# Patient Record
Sex: MALE | Race: WHITE | URBAN - METROPOLITAN AREA
[De-identification: names, ages, dates, MRNs, and addresses within clinical notes are randomized per-mention and may not be internally consistent; named-entity substitution may affect disease eponyms.]

---

## 2024-05-09 ENCOUNTER — INITIAL CONSULT (OUTPATIENT)
Age: 59
End: 2024-05-09

## 2024-05-09 VITALS
HEIGHT: 68 IN | OXYGEN SATURATION: 95 % | WEIGHT: 220 LBS | BODY MASS INDEX: 33.34 KG/M2 | SYSTOLIC BLOOD PRESSURE: 118 MMHG | HEART RATE: 62 BPM | RESPIRATION RATE: 16 BRPM | TEMPERATURE: 97.3 F | DIASTOLIC BLOOD PRESSURE: 66 MMHG

## 2024-05-09 DIAGNOSIS — R45.89 ANXIETY ABOUT HEALTH: Primary | ICD-10-CM

## 2024-05-09 DIAGNOSIS — G89.3 CANCER RELATED PAIN: ICD-10-CM

## 2024-05-09 DIAGNOSIS — C79.51 SECONDARY CARCINOMA OF BONE (HCC): ICD-10-CM

## 2024-05-09 DIAGNOSIS — C61 PROSTATE CANCER (HCC): ICD-10-CM

## 2024-05-09 DIAGNOSIS — Z79.818 ANDROGEN DEPRIVATION THERAPY: ICD-10-CM

## 2024-05-09 PROCEDURE — 99204 OFFICE O/P NEW MOD 45 MIN: CPT | Performed by: INTERNAL MEDICINE

## 2024-05-09 RX ORDER — CHLORAL HYDRATE 500 MG
1000 CAPSULE ORAL 2 TIMES DAILY
COMMUNITY

## 2024-05-09 RX ORDER — ATENOLOL 50 MG/1
50 TABLET ORAL DAILY
COMMUNITY

## 2024-05-09 RX ORDER — LISINOPRIL 10 MG/1
10 TABLET ORAL DAILY
COMMUNITY

## 2024-05-09 RX ORDER — VIT C/B6/B5/MAGNESIUM/HERB 173 50-5-6-5MG
1000 CAPSULE ORAL 2 TIMES DAILY
COMMUNITY

## 2024-05-09 RX ORDER — GLIMEPIRIDE 4 MG/1
4 TABLET ORAL
COMMUNITY
Start: 2024-02-11

## 2024-05-09 RX ORDER — SPIRONOLACTONE 25 MG/1
25 TABLET ORAL DAILY
COMMUNITY

## 2024-05-09 RX ORDER — SIMVASTATIN 80 MG
80 TABLET ORAL DAILY
COMMUNITY
Start: 2011-03-08

## 2024-05-09 RX ORDER — ASPIRIN 81 MG/1
81 TABLET ORAL DAILY
COMMUNITY

## 2024-05-09 RX ORDER — EMPAGLIFLOZIN 25 MG/1
25 TABLET, FILM COATED ORAL DAILY
COMMUNITY
Start: 2024-04-22

## 2024-05-09 ASSESSMENT — PATIENT HEALTH QUESTIONNAIRE - PHQ9
1. LITTLE INTEREST OR PLEASURE IN DOING THINGS: SEVERAL DAYS
SUM OF ALL RESPONSES TO PHQ QUESTIONS 1-9: 15
8. MOVING OR SPEAKING SO SLOWLY THAT OTHER PEOPLE COULD HAVE NOTICED. OR THE OPPOSITE, BEING SO FIGETY OR RESTLESS THAT YOU HAVE BEEN MOVING AROUND A LOT MORE THAN USUAL: SEVERAL DAYS
SUM OF ALL RESPONSES TO PHQ QUESTIONS 1-9: 15
SUM OF ALL RESPONSES TO PHQ9 QUESTIONS 1 & 2: 3
SUM OF ALL RESPONSES TO PHQ QUESTIONS 1-9: 15
2. FEELING DOWN, DEPRESSED OR HOPELESS: MORE THAN HALF THE DAYS
SUM OF ALL RESPONSES TO PHQ QUESTIONS 1-9: 15
3. TROUBLE FALLING OR STAYING ASLEEP: MORE THAN HALF THE DAYS
9. THOUGHTS THAT YOU WOULD BE BETTER OFF DEAD, OR OF HURTING YOURSELF: NOT AT ALL
6. FEELING BAD ABOUT YOURSELF - OR THAT YOU ARE A FAILURE OR HAVE LET YOURSELF OR YOUR FAMILY DOWN: MORE THAN HALF THE DAYS
4. FEELING TIRED OR HAVING LITTLE ENERGY: NEARLY EVERY DAY
10. IF YOU CHECKED OFF ANY PROBLEMS, HOW DIFFICULT HAVE THESE PROBLEMS MADE IT FOR YOU TO DO YOUR WORK, TAKE CARE OF THINGS AT HOME, OR GET ALONG WITH OTHER PEOPLE: NOT DIFFICULT AT ALL
7. TROUBLE CONCENTRATING ON THINGS, SUCH AS READING THE NEWSPAPER OR WATCHING TELEVISION: MORE THAN HALF THE DAYS
5. POOR APPETITE OR OVEREATING: MORE THAN HALF THE DAYS

## 2024-05-09 NOTE — PROGRESS NOTES
Cancer [place] at [facility]  [address], [address] [facility] [address]  W: [phone]  F: [phone] Patient ID  Name: Kenroy Reno  YOB: 1965  MRN: [id number]  Referring Provider:   Donna Zendejas MD  [address] [place] [address]  Primary Care Provider:   Costa Reno MD       HEMATOLOGY/MEDICAL ONCOLOGY  NOTE     Reason for Evaluation:     Chief Complaint   Patient presents with    New Patient     Oncology History:   Please review original records for clinical decision making. This summary highlights focused aspects of patient's ongoing care and may have a recurring section in notes with either updates or remain unchanged as a longitudinal care summary.  ______________________________________________________  DIAGNOSIS: METASTATIC/RECURRENT PROSTATE CANCER  <>PATHOLOGY<>  [id number] PROSTATE BIOPSY;  [facility]  2/3/2011  ADENOCARCINOMA (PIA RIGHT MID LATERAL, RIGHT BASE MEDIAL, RIGHT BASE LATERAL:  (PIA 4+5=9)     <>STAGING<> RECURRENT DISEASE, Bone Disease, lymph nodes mediastinal,bilateral hilar. Extensive bone disease in thoracic, Lumbar, appendicular skeleton..  Cancer Staging   No matching staging information was found for the patient.     <>GOALS OF CARE<>PALLIATIVE INTENT  <>CURRENT TREATMENT<>Abiraterone,Prednisone  <>PRIOR TREATMENT> Per outside Records ([facility] note from 4/22/20224 (Dr. Price): Prostatectomy 3/2011 (Dr. Sosa). Rise in PSA 7/2014, ADT 2014.    Abitraterone/Prednisone 1/2023--? Completed 6 cycles of taxotere from 1/2023. Started Xgeva 7/2023.  <>BIOMARKERS<>  -- PSA 0.6 from 4/15/24  0.5 9/7/23,  1.5 on 4/24/23, PSA 3.0 from 1/30/23. PSA down to 1.7 on 3/13/23.  PSMA PET on 7./23 reportedly showed treatment response.    Subjective:     History of Present Illness:   Date of Visit: 05/09/24   Kenroy Reno is a 59 y.o. male who presents for an initial evaluation for transfer of care from [facility] for

## 2024-05-10 ENCOUNTER — TELEPHONE (OUTPATIENT)
Age: 59
End: 2024-05-10

## 2024-05-10 NOTE — TELEPHONE ENCOUNTER
[facility] Palliative Medicine Office  Nursing Note  [phone]  Fax [phone]      Name:  Kenroy Reno  YOB: 1965    Received outpatient Palliative Medicine referral from Dr. Darvin Marion to see patient for symptom management and supportive care. Chart  reviewed. Kenroy Reno is a 59 y.o. male with prostate cancer.  Patient's initial office visit with Dr. Marion was on 5/9/24.      Nurse called patient and his wife answered the phone.  She states that she schedules his appointments.  This nurse explained [facility] outpatient Palliative Medicine services. Appointment scheduled for 6/19/24 at 10:30am with Dr. Rome Osuna.  This nurse offered an appointment sooner, wife states they would like to cluster appts on 6/19/24.     Beatriz Bruno RN, Gerontological Nursing-BC, PN

## 2024-05-10 NOTE — TELEPHONE ENCOUNTER
Lvm to get patient set up for Return in about 6 weeks (around 6/17/2024) for MD VISIT, LUPRON, xgeva,, PORT FLUSH.

## 2024-05-13 RX ORDER — ABIRATERONE ACETATE 250 MG/1
1000 TABLET ORAL DAILY
COMMUNITY
Start: 2024-04-08

## 2024-05-13 RX ORDER — PREDNISONE 5 MG/1
5 TABLET ORAL 2 TIMES DAILY
COMMUNITY
Start: 2024-02-11

## 2024-05-20 ENCOUNTER — TELEPHONE (OUTPATIENT)
Age: 59
End: 2024-05-20

## 2024-05-20 NOTE — TELEPHONE ENCOUNTER
The patient's wife called to request an earlier appt w/ Dr. Osuna. He was scheduled for 6/19. I offered 6/5, but she declined. Asked if there was any other doctor at any other location that could see him sooner. Offered 5/23 at 10:00 w/ Dr. Morin at [facility]. She accepted.

## 2024-05-23 ENCOUNTER — OFFICE VISIT (OUTPATIENT)
Age: 59
End: 2024-05-23

## 2024-05-23 ENCOUNTER — TELEPHONE (OUTPATIENT)
Age: 59
End: 2024-05-23

## 2024-05-23 VITALS
HEART RATE: 63 BPM | BODY MASS INDEX: 32.96 KG/M2 | RESPIRATION RATE: 18 BRPM | WEIGHT: 217.5 LBS | HEIGHT: 68 IN | OXYGEN SATURATION: 98 % | SYSTOLIC BLOOD PRESSURE: 143 MMHG | DIASTOLIC BLOOD PRESSURE: 73 MMHG

## 2024-05-23 DIAGNOSIS — F17.200 TOBACCO DEPENDENCE: ICD-10-CM

## 2024-05-23 DIAGNOSIS — C79.51 PROSTATE CANCER METASTATIC TO BONE (HCC): ICD-10-CM

## 2024-05-23 DIAGNOSIS — C61 PROSTATE CANCER METASTATIC TO BONE (HCC): ICD-10-CM

## 2024-05-23 DIAGNOSIS — R53.0 NEOPLASTIC (MALIGNANT) RELATED FATIGUE: ICD-10-CM

## 2024-05-23 DIAGNOSIS — E11.9 TYPE 2 DIABETES MELLITUS WITHOUT COMPLICATION, WITHOUT LONG-TERM CURRENT USE OF INSULIN (HCC): ICD-10-CM

## 2024-05-23 DIAGNOSIS — R64 MALIGNANT CACHEXIA (HCC): ICD-10-CM

## 2024-05-23 DIAGNOSIS — F43.21 ADJUSTMENT DISORDER WITH DEPRESSED MOOD: Primary | ICD-10-CM

## 2024-05-23 PROCEDURE — 99205 OFFICE O/P NEW HI 60 MIN: CPT | Performed by: INTERNAL MEDICINE

## 2024-05-23 NOTE — PROGRESS NOTES
Palliative Medicine Outpatient Clinic  Nurse Check in Note  [phone]    Patient Name: Kenroy Reno  YOB: 1965      Date of Visit: 05/23/2024  Visit Location:  [facility]    Chief patient or family concern today: NP to establish.  Discuss intermittent back pain     Medications:  Med reconciliation was performed with:  Patient    Requested refills:  None    If prescribed an opioid, does patient have access to naloxone at home?:  NA  If No, pend naloxone nasal spray    Function and Symptoms:  Use of assist devices:  None    Palliative Performance Status (PPS):   Palliative Performance Scale (PPS)  PPS: 70    ESAS:  Modified-[place] Symptom Assessment Scale (ESAS)  Tiredness Score: 8  Drowsiness Score: 7  Depression Score: 9  Pain Score: 5  Anxiety Score: 8  Nausea Score: 3  Appetite Score: 5  Dyspnea Score: 4  Constipation: No  Wellbeing Score: 7    Constipation?  No  Last BM: 05/22/24    Advance Care Planning:  Currently listed healthcare agent:      Is there an ACP Note within the past 12 months?  NO  If No, Alert Clinician and/or Social Work      Erika Jeffers LPN        
Encounters:   05/23/24 98.7 kg (217 lb 8 oz)   05/09/24 99.8 kg (220 lb)     Blood pressure (!) 143/73, pulse 63, resp. rate 18, height 1.727 m (5' 8\"), weight 98.7 kg (217 lb 8 oz), SpO2 98 %.  Last bowel movement: See Nursing Note    Constitutional: Overweight white male, sits up comfortably in chair, appears generally well, NAD  Eyes: sclerae anicteric  ENMT: no nasal discharge, moist mucous membranes  Cardiovascular: regular rhythm, distal pulses intact  Respiratory: breathing not labored, symmetric  Gastrointestinal: soft non-tender, +bowel sounds  Musculoskeletal: no deformity, no tenderness to palpation  Skin: warm, dry  Neurologic: cognition intact, following commands, moving all extremities  Psychiatric: full affect    DATA REVIEWED:   CT Result (most recent):  No results found for this or any previous visit from the past 3650 days.      PET Results (most recent):  No results found for this or any previous visit from the past 3650 days.      CONTROLLED SUBSTANCES SAFETY ASSESSMENT (IF ON CONTROLLED SUBSTANCES):     Reviewed patient record in [place] Prescription Drug Monitoring Program (PDMP).    Reviewed opioid safety handout:  [x] Yes   [] No  24 hour opioid dose >150mg morphine equivalent/day:  [] Yes   [] No  Benzodiazepines:  [] Yes   [] No  Sleep apnea:  [] Yes   [] No  Urine Toxicology Testing within last 6 months:  [] Yes   [] No  History of or new aberrant medication taking behaviors:  [] Yes   [x] No  Has Narcan been prescribed [x] Yes   [] No          Only check if applicable and billing time based rather than [facility]    [x]   The total encounter time on this service date was  60 minutes which was spent performing a face-to-face encounter and personally completing the provider-level activities documented in the note.  This includes time spent prior to the visit and after the visit in direct care of the patient.  This time does not include time spent in any separately reportable services.

## 2024-05-23 NOTE — PATIENT INSTRUCTIONS
Dear Kenroy Reno ,    It was a pleasure seeing you today at [facility].   Return in about 4 weeks (around 6/20/2024) for follow up in person or virtual - pt's choice.    If labs or imaging tests have been ordered for you today, please call the office  at [phone] 48 hours after completion to obtain the results.        This is the plan we talked about:    Increase your sertraline (Zoloft) to 50 mg daily starting tomorrow morning.   We will see you in 4 weeks to check on the dose and discuss a possible increase.     We talked about considering a sleep study.  Reach out any time if you'd like a referral for this.    Look into the [facility] (        The Palliative Medicine Team is here to support you and your family.       Sincerely,      Eula Morin MD

## 2024-05-23 NOTE — TELEPHONE ENCOUNTER
Palliative Medicine  Nursing Note  [phone]  Fax [phone]      Telephone Call  Patient Name: Kenroy Reno  YOB: [birth year]    Incoming call transferred to Nurse.    Spoke with Francisco who asked if there is anything he can take OTC for the hot flashes and increased perspiration. He stated his body remains cool but his head breaks out in a sweat.     Forwarded encounter to provider, Dr. Morin for review and recommendations.    Emily Pak RN  Palliative Medicine

## 2024-05-23 NOTE — TELEPHONE ENCOUNTER
The patient is asking if there is anything he can take OTC for hot flashes/sweats. Warm xfer to nurse to further assist.

## 2024-05-24 PROBLEM — E11.9 TYPE 2 DIABETES MELLITUS WITHOUT COMPLICATION, WITHOUT LONG-TERM CURRENT USE OF INSULIN (HCC): Status: ACTIVE | Noted: 2024-05-24

## 2024-05-24 PROBLEM — F17.200 TOBACCO DEPENDENCE: Status: ACTIVE | Noted: 2024-05-24

## 2024-05-24 PROBLEM — F43.21 ADJUSTMENT DISORDER WITH DEPRESSED MOOD: Status: ACTIVE | Noted: 2024-05-24

## 2024-05-24 PROBLEM — C61 PROSTATE CANCER METASTATIC TO BONE (HCC): Status: ACTIVE | Noted: 2024-05-09

## 2024-05-24 PROBLEM — R53.0 NEOPLASTIC (MALIGNANT) RELATED FATIGUE: Status: ACTIVE | Noted: 2024-05-24

## 2024-05-24 NOTE — TELEPHONE ENCOUNTER
Palliative Medicine  Nursing Note  [phone]  Fax [phone]      Telephone Call  Patient Name: Kenroy Reno  YOB: 1965/2024    Follow-up call placed to Francisco regarding hot flashes.     Spoke with Francisco and he informed me he had already been in his MyChart and printed the information and recommendations Dr. Morin advised in her recent note.     Patient does not want to pursue nicotine replacement patches at this time.     Patient know to call with any questions, concerns or changes in condition Palliative Medicine [phone].    Emily Pak RN  Palliative Medicine

## 2024-05-24 NOTE — TELEPHONE ENCOUNTER
The hot flashes are from his androgen deprivation therapy.    An SSRI is actually one of the first line options for this, so it is good we are increasing his Zoloft.    Let's see how he does on this before adding any additional prescription medications.     Other lifestyle strategies:  Lifestyle: Observational studies suggest that avoiding tobacco, keeping ambient temperatures cool and limiting or avoiding caffeine, alcohol, spicy foods and hot liquids may be helpful.    So he should think about cutting back on smoking.  I can start him on a nicotine replacement patch if he would like to try and cut back.

## 2024-05-31 ENCOUNTER — TRANSCRIBE ORDERS (OUTPATIENT)
Facility: HOSPITAL | Age: 59
End: 2024-05-31

## 2024-05-31 DIAGNOSIS — R13.10 DYSPHAGIA, UNSPECIFIED TYPE: Primary | ICD-10-CM

## 2024-06-05 ENCOUNTER — HOSPITAL ENCOUNTER (OUTPATIENT)
Facility: HOSPITAL | Age: 59
Discharge: HOME | End: 2024-06-08
Payer: COMMERCIAL

## 2024-06-05 DIAGNOSIS — R13.10 DYSPHAGIA, UNSPECIFIED TYPE: ICD-10-CM

## 2024-06-05 PROCEDURE — 74220 X-RAY XM ESOPHAGUS 1CNTRST: CPT

## 2024-06-07 ENCOUNTER — TELEPHONE (OUTPATIENT)
Age: 59
End: 2024-06-07

## 2024-06-07 NOTE — TELEPHONE ENCOUNTER
Pt spouse called and stated she would like to go over the pt's recent test results (Labs/ PET scan).Please advise     CB# [phone]   Detail Level: Detailed

## 2024-06-07 NOTE — TELEPHONE ENCOUNTER
[facility]  [phone]    06/07/24 9:24 AM EDT - Called and spoke with the patient's wife, Mai Segovia).  Patient's ID verified x 2.  Advised unfortunately there is no PHI form scanned into his chart so we need to obtain verbal consent from the patient to discuss his care with her.  Claribel gave the phone to the patient and he provided verbal consent to discuss his care with his wife.  Claribel states the patient had an esophogram done on Monday and would like to discuss the results with Dr. Marion.  Advised Dr. Marion is currently out of the office but this nurse can relay their concerns to our NP, Analia.  Claribel states the esophagram impression advises to consider a bone scan for further evaluation but she wonders if a PET scan should be ordered.  She inquired if the additional imaging could be ordered and done prior to the patient's appointment on 6/19 with Dr. Marion.  She states the patient has had increased pain over the past two weeks.  He is having increased difficulty eating and is losing weight.  Encouraged her to reach out to palliative regarding the increased pain to see what recommendations they may have.  Advised this nurse will update our NP of above and call back with recommendations.  Claribel voiced understanding and gratitude for the call.  No further questions or concerns.                 [facility]  [phone]    06/07/24 1:55 PM EDT - Called and spoke with the patient and Claribel.  Patient's ID verified x 2.  Advised we would like to add him on for an appointment to discuss this in more detail.  The patient voiced understanding and gratitude for the call.  He is now scheduled for Tuesday, 6/11 at 11:30 AM.  No further questions or concerns.

## 2024-06-11 ENCOUNTER — OFFICE VISIT (OUTPATIENT)
Age: 59
End: 2024-06-11
Payer: COMMERCIAL

## 2024-06-11 VITALS
HEIGHT: 68 IN | WEIGHT: 215 LBS | TEMPERATURE: 97.9 F | OXYGEN SATURATION: 96 % | RESPIRATION RATE: 18 BRPM | DIASTOLIC BLOOD PRESSURE: 76 MMHG | BODY MASS INDEX: 32.58 KG/M2 | HEART RATE: 67 BPM | SYSTOLIC BLOOD PRESSURE: 116 MMHG

## 2024-06-11 DIAGNOSIS — G89.3 CANCER RELATED PAIN: ICD-10-CM

## 2024-06-11 DIAGNOSIS — C61 PROSTATE CANCER (HCC): ICD-10-CM

## 2024-06-11 DIAGNOSIS — C79.51 SECONDARY CARCINOMA OF BONE (HCC): ICD-10-CM

## 2024-06-11 DIAGNOSIS — R53.83 OTHER FATIGUE: ICD-10-CM

## 2024-06-11 DIAGNOSIS — C79.51 SECONDARY CARCINOMA OF BONE (HCC): Primary | ICD-10-CM

## 2024-06-11 PROCEDURE — 99214 OFFICE O/P EST MOD 30 MIN: CPT | Performed by: INTERNAL MEDICINE

## 2024-06-11 ASSESSMENT — PATIENT HEALTH QUESTIONNAIRE - PHQ9
SUM OF ALL RESPONSES TO PHQ QUESTIONS 1-9: 0
2. FEELING DOWN, DEPRESSED OR HOPELESS: NOT AT ALL
SUM OF ALL RESPONSES TO PHQ QUESTIONS 1-9: 0
1. LITTLE INTEREST OR PLEASURE IN DOING THINGS: NOT AT ALL
SUM OF ALL RESPONSES TO PHQ9 QUESTIONS 1 & 2: 0

## 2024-06-11 NOTE — PROGRESS NOTES
Chief Complaint   Patient presents with    Follow-up           Vitals:    06/11/24 1149   BP: 116/76   Pulse: 67   Resp: 18   Temp: 97.9 °F (36.6 °C)   SpO2: 96%            1. Have you been to the ER, urgent care clinic since your last visit?  Hospitalized since your last visit?  No  2. Have you seen or consulted any other health care providers outside of the [facility] since your last visit?  Include any pap smears or colon screening. Yes Cardiologist, Dermatologist

## 2024-06-11 NOTE — PROGRESS NOTES
Cancer [place] at [facility]  [address], [address] [facility] [address]  W: [phone]  F: [phone] Patient ID  Name: Kenroy Reno  YOB: 1965  MRN: [id number]  Referring Provider:   No referring provider defined for this encounter.  Primary Care Provider:   Costa Reno MD       HEMATOLOGY/MEDICAL ONCOLOGY  NOTE     Reason for Evaluation:     Chief Complaint   Patient presents with    Follow-up     Oncology History:   Please review original records for clinical decision making. This summary highlights focused aspects of patient's ongoing care and may have a recurring section in notes with either updates or remain unchanged as a longitudinal care summary.  ______________________________________________________  DIAGNOSIS: METASTATIC/RECURRENT PROSTATE CANCER  <>PATHOLOGY<>  [id number] PROSTATE BIOPSY;  [facility]  2/3/2011  ADENOCARCINOMA (PIA RIGHT MID LATERAL, RIGHT BASE MEDIAL, RIGHT BASE LATERAL:  (PIA 4+5=9)     <>STAGING<> RECURRENT DISEASE, Bone Disease, lymph nodes mediastinal,bilateral hilar. Extensive bone disease in thoracic, Lumbar, appendicular skeleton..   Cancer Staging   No matching staging information was found for the patient.     <>GOALS OF CARE<>PALLIATIVE INTENT  <>CURRENT TREATMENT<>Abiraterone,Prednisone  <>PRIOR TREATMENT> Per outside Records ([facility] note from 4/22/20224 (Dr. Price): Prostatectomy 3/2011 (Dr. Sosa). Rise in PSA 7/2014, ADT 2014.    Abitraterone/Prednisone 1/2023--? Completed 6 cycles of taxotere from 1/2023. Started Xgeva 7/2023.  <>BIOMARKERS<>  -- PSA 0.6 from 4/15/24  0.5 9/7/23,  1.5 on 4/24/23, PSA 3.0 from 1/30/23. PSA down to 1.7 on 3/13/23.  PSMA PET on 7./23 reportedly showed treatment response.    Subjective:     History of Present Illness:   Date of Visit: 06/11/24   Kenroy Reno is a 59 y.o. male who presents for a follow-up evaluation for metastatic prostate cancer.   He is scheduled for his

## 2024-06-13 LAB — TESTOST SERPL-MCNC: <3 NG/DL (ref 264–916)

## 2024-06-17 ENCOUNTER — TELEPHONE (OUTPATIENT)
Age: 59
End: 2024-06-17

## 2024-06-17 DIAGNOSIS — C79.51 PROSTATE CANCER METASTATIC TO BONE (HCC): ICD-10-CM

## 2024-06-17 DIAGNOSIS — C61 PROSTATE CANCER METASTATIC TO BONE (HCC): ICD-10-CM

## 2024-06-17 DIAGNOSIS — C79.51 PROSTATE CANCER METASTATIC TO BONE (HCC): Primary | ICD-10-CM

## 2024-06-17 DIAGNOSIS — C61 PROSTATE CANCER METASTATIC TO BONE (HCC): Primary | ICD-10-CM

## 2024-06-17 RX ORDER — ALBUTEROL SULFATE 90 UG/1
4 AEROSOL, METERED RESPIRATORY (INHALATION) PRN
Status: CANCELLED | OUTPATIENT
Start: 2024-06-19

## 2024-06-17 RX ORDER — ACETAMINOPHEN 325 MG/1
650 TABLET ORAL
Status: CANCELLED | OUTPATIENT
Start: 2024-06-19

## 2024-06-17 RX ORDER — DIPHENHYDRAMINE HYDROCHLORIDE 50 MG/ML
50 INJECTION INTRAMUSCULAR; INTRAVENOUS
Status: CANCELLED | OUTPATIENT
Start: 2024-06-19

## 2024-06-17 RX ORDER — ONDANSETRON 2 MG/ML
8 INJECTION INTRAMUSCULAR; INTRAVENOUS
Status: CANCELLED | OUTPATIENT
Start: 2024-06-19

## 2024-06-17 RX ORDER — SODIUM CHLORIDE 9 MG/ML
INJECTION, SOLUTION INTRAVENOUS CONTINUOUS
Status: CANCELLED | OUTPATIENT
Start: 2024-06-19

## 2024-06-17 RX ORDER — EPINEPHRINE 1 MG/ML
0.3 INJECTION, SOLUTION, CONCENTRATE INTRAVENOUS PRN
Status: CANCELLED | OUTPATIENT
Start: 2024-06-19

## 2024-06-17 NOTE — TELEPHONE ENCOUNTER
Pt called in stating he was sent to the lab last week for testosterone and PSA labs and they aren't seeing the PSA results, pt in concerned Dr Marion wont have them for the next appt    # [phone]

## 2024-06-17 NOTE — TELEPHONE ENCOUNTER
[facility] [place] [facility]  [phone]    06/17/24 12:43 PM EDT - Called and spoke with the patient's wife, Mai.  Patient's ID verified x 2.  Advised it appears not all of the labs were drawn as they should have been.  Offered to add the labs on for Wednesday's [facility] appointment.  Mai states the patient is going to [facility] this afternoon and asked this nurse to fax the orders to the [facility] off of [address].  Advised this nurse will fax the orders there.  Mai voiced understanding and gratitude for the call.  No further questions or concerns.

## 2024-06-18 ENCOUNTER — TELEPHONE (OUTPATIENT)
Age: 59
End: 2024-06-18

## 2024-06-18 LAB
ALBUMIN SERPL-MCNC: 4.3 G/DL (ref 3.9–4.9)
ALP SERPL-CCNC: 80 IU/L (ref 44–121)
ALT SERPL-CCNC: 19 IU/L (ref 0–44)
AST SERPL-CCNC: 19 IU/L (ref 0–40)
BASOPHILS # BLD AUTO: 0.1 X10E3/UL (ref 0–0.2)
BILIRUB SERPL-MCNC: 0.4 MG/DL (ref 0–1.2)
BUN SERPL-MCNC: 14 MG/DL (ref 8–27)
CALCIUM SERPL-MCNC: 9.6 MG/DL (ref 8.6–10.2)
CHLORIDE SERPL-SCNC: 100 MMOL/L (ref 96–106)
CO2 SERPL-SCNC: 24 MMOL/L (ref 20–29)
CREAT SERPL-MCNC: 0.94 MG/DL (ref 0.76–1.27)
EGFRCR SERPLBLD CKD-EPI 2021: 92 ML/MIN/1.73
EOSINOPHIL NFR BLD AUTO: 1 %
ERYTHROCYTE [DISTWIDTH] IN BLOOD BY AUTOMATED COUNT: 12.8 % (ref 11.6–15.4)
GLOBULIN SER CALC-MCNC: 2.2 G/DL (ref 1.5–4.5)
GLUCOSE SERPL-MCNC: 126 MG/DL (ref 70–99)
HCT VFR BLD AUTO: 43.7 % (ref 37.5–51)
HGB BLD-MCNC: 15.3 G/DL (ref 13–17.7)
IMM GRANULOCYTES # BLD AUTO: 0.1 X10E3/UL (ref 0–0.1)
IMM GRANULOCYTES NFR BLD AUTO: 1 %
LYMPHOCYTES # BLD AUTO: 3.3 X10E3/UL (ref 0.7–3.1)
LYMPHOCYTES NFR BLD AUTO: 27 %
MCH RBC QN AUTO: 32.9 PG (ref 26.6–33)
MCHC RBC AUTO-ENTMCNC: 35 G/DL (ref 31.5–35.7)
MCV RBC AUTO: 94 FL (ref 79–97)
MONOCYTES # BLD AUTO: 1 X10E3/UL (ref 0.1–0.9)
MONOCYTES NFR BLD AUTO: 8 %
NEUTROPHILS # BLD AUTO: 8 X10E3/UL (ref 1.4–7)
NEUTROPHILS NFR BLD AUTO: 62 %
PLATELET # BLD AUTO: 260 X10E3/UL (ref 150–450)
POTASSIUM SERPL-SCNC: 4.2 MMOL/L (ref 3.5–5.2)
PROT SERPL-MCNC: 6.5 G/DL (ref 6–8.5)
PSA SERPL-MCNC: 0.9 NG/ML (ref 0–4)
RBC # BLD AUTO: 4.65 X10E6/UL (ref 4.14–5.8)
SODIUM SERPL-SCNC: 137 MMOL/L (ref 134–144)
WBC # BLD AUTO: 12.6 X10E3/UL (ref 3.4–10.8)

## 2024-06-18 NOTE — TELEPHONE ENCOUNTER
Palliative Medicine  Nursing Note  [phone]  Fax [phone]      Telephone Call  Patient Name: Kenroy Reno  YOB: 1965/2024    Return call placed to patient's wife Claribel regarding patient's current condition.    Spoke with Claribel who informed me that Francisco has become increasingly drowsy over the past two weeks. This past weekend was the worst, she states \"Francisco was a zombi half the time and coherent the other half.\" On Sunday 6/16/24, Francisco kept repeating he can't to Claribel and told her he thought something was wrong with him.    Claribel reports Francisco recently had boss of 48yrs pass away. He also found out his PSA has increased and is scheduled to his Oncologist tomorrow and have a PET scan next week.    Changes in recent medication include discontinuation of Jardiance due to recurrent yeast infections. Patient takes blood sugar three times daily.    Francisco and wife, Claribel would like to know if decreasing his Zoloft 50mg would help with his fatigue?     I have forwarded this encounter to provider, Dr. Morin for review and recommendations.     Emily Pak RN  Palliative Medicine

## 2024-06-18 NOTE — TELEPHONE ENCOUNTER
Follow up phone call the patient's wife, Claribel regarding change in Zoloft.     Per provider, Dr. Morin patient can go down on Zoloft from 50mg to 25mg daily.    Claribel verbalized understanding and said they can cut the current pill in half and during their next virtual visit they will ask for a new prescription for a 25mg tablet.

## 2024-06-18 NOTE — TELEPHONE ENCOUNTER
We can certainly go down on the Zoloft back to 25 mg once a day and see what happens- I support this.  Let me know if he needs a new prescription.

## 2024-06-19 ENCOUNTER — HOSPITAL ENCOUNTER (OUTPATIENT)
Facility: HOSPITAL | Age: 59
Setting detail: INFUSION SERIES
Discharge: HOME | End: 2024-06-19
Payer: COMMERCIAL

## 2024-06-19 ENCOUNTER — PATIENT MESSAGE (OUTPATIENT)
Age: 59
End: 2024-06-19

## 2024-06-19 ENCOUNTER — OFFICE VISIT (OUTPATIENT)
Age: 59
End: 2024-06-19
Payer: COMMERCIAL

## 2024-06-19 VITALS
WEIGHT: 214 LBS | BODY MASS INDEX: 32.43 KG/M2 | SYSTOLIC BLOOD PRESSURE: 127 MMHG | TEMPERATURE: 97.7 F | HEART RATE: 74 BPM | DIASTOLIC BLOOD PRESSURE: 70 MMHG | OXYGEN SATURATION: 98 % | HEIGHT: 68 IN | RESPIRATION RATE: 18 BRPM

## 2024-06-19 VITALS
DIASTOLIC BLOOD PRESSURE: 65 MMHG | RESPIRATION RATE: 18 BRPM | HEIGHT: 68 IN | WEIGHT: 213.2 LBS | BODY MASS INDEX: 32.31 KG/M2 | OXYGEN SATURATION: 97 % | TEMPERATURE: 97.5 F | HEART RATE: 69 BPM | SYSTOLIC BLOOD PRESSURE: 123 MMHG

## 2024-06-19 DIAGNOSIS — T50.905A HOT FLASH DUE TO MEDICATION: ICD-10-CM

## 2024-06-19 DIAGNOSIS — Z79.899 HIGH RISK MEDICATION USE: ICD-10-CM

## 2024-06-19 DIAGNOSIS — C61 PROSTATE CANCER METASTATIC TO BONE (HCC): Primary | ICD-10-CM

## 2024-06-19 DIAGNOSIS — R23.2 HOT FLASH DUE TO MEDICATION: ICD-10-CM

## 2024-06-19 DIAGNOSIS — C61 PROSTATE CANCER (HCC): Primary | ICD-10-CM

## 2024-06-19 DIAGNOSIS — Z79.818 ANDROGEN DEPRIVATION THERAPY: ICD-10-CM

## 2024-06-19 DIAGNOSIS — C79.51 SECONDARY CARCINOMA OF BONE (HCC): Primary | ICD-10-CM

## 2024-06-19 DIAGNOSIS — C79.51 PROSTATE CANCER METASTATIC TO BONE (HCC): Primary | ICD-10-CM

## 2024-06-19 PROCEDURE — 99214 OFFICE O/P EST MOD 30 MIN: CPT | Performed by: INTERNAL MEDICINE

## 2024-06-19 PROCEDURE — 96402 CHEMO HORMON ANTINEOPL SQ/IM: CPT

## 2024-06-19 PROCEDURE — 96372 THER/PROPH/DIAG INJ SC/IM: CPT

## 2024-06-19 PROCEDURE — 6360000002 HC RX W HCPCS: Performed by: INTERNAL MEDICINE

## 2024-06-19 RX ORDER — FAMOTIDINE 10 MG/ML
20 INJECTION, SOLUTION INTRAVENOUS
OUTPATIENT
Start: 2024-09-11

## 2024-06-19 RX ORDER — ACETAMINOPHEN 325 MG/1
650 TABLET ORAL
OUTPATIENT
Start: 2024-07-14

## 2024-06-19 RX ORDER — ALBUTEROL SULFATE 90 UG/1
4 AEROSOL, METERED RESPIRATORY (INHALATION) PRN
OUTPATIENT
Start: 2024-09-11

## 2024-06-19 RX ORDER — PREDNISONE 5 MG/1
5 TABLET ORAL EVERY 12 HOURS
Qty: 180 TABLET | Refills: 0 | Status: SHIPPED | OUTPATIENT
Start: 2024-06-19 | End: 2024-09-17

## 2024-06-19 RX ORDER — DIPHENHYDRAMINE HYDROCHLORIDE 50 MG/ML
50 INJECTION INTRAMUSCULAR; INTRAVENOUS
OUTPATIENT
Start: 2024-07-14

## 2024-06-19 RX ORDER — SODIUM CHLORIDE 9 MG/ML
INJECTION, SOLUTION INTRAVENOUS CONTINUOUS
OUTPATIENT
Start: 2024-07-14

## 2024-06-19 RX ORDER — EPINEPHRINE 1 MG/ML
0.3 INJECTION, SOLUTION INTRAMUSCULAR; SUBCUTANEOUS PRN
OUTPATIENT
Start: 2024-09-11

## 2024-06-19 RX ORDER — ONDANSETRON 2 MG/ML
8 INJECTION INTRAMUSCULAR; INTRAVENOUS
OUTPATIENT
Start: 2024-09-11

## 2024-06-19 RX ORDER — EPINEPHRINE 1 MG/ML
0.3 INJECTION, SOLUTION INTRAMUSCULAR; SUBCUTANEOUS PRN
OUTPATIENT
Start: 2024-07-14

## 2024-06-19 RX ORDER — ALBUTEROL SULFATE 90 UG/1
4 AEROSOL, METERED RESPIRATORY (INHALATION) PRN
OUTPATIENT
Start: 2024-07-14

## 2024-06-19 RX ORDER — DIPHENHYDRAMINE HYDROCHLORIDE 50 MG/ML
50 INJECTION INTRAMUSCULAR; INTRAVENOUS
OUTPATIENT
Start: 2024-09-11

## 2024-06-19 RX ORDER — FAMOTIDINE 10 MG/ML
20 INJECTION, SOLUTION INTRAVENOUS
OUTPATIENT
Start: 2024-07-14

## 2024-06-19 RX ORDER — ACETAMINOPHEN 325 MG/1
650 TABLET ORAL
OUTPATIENT
Start: 2024-09-11

## 2024-06-19 RX ORDER — ONDANSETRON 2 MG/ML
8 INJECTION INTRAMUSCULAR; INTRAVENOUS
OUTPATIENT
Start: 2024-07-14

## 2024-06-19 RX ORDER — SODIUM CHLORIDE 9 MG/ML
INJECTION, SOLUTION INTRAVENOUS CONTINUOUS
OUTPATIENT
Start: 2024-09-11

## 2024-06-19 RX ADMIN — DENOSUMAB 120 MG: 120 INJECTION SUBCUTANEOUS at 14:25

## 2024-06-19 RX ADMIN — LEUPROLIDE ACETATE 22.5 MG: KIT at 14:25

## 2024-06-19 ASSESSMENT — PATIENT HEALTH QUESTIONNAIRE - PHQ9
2. FEELING DOWN, DEPRESSED OR HOPELESS: NOT AT ALL
SUM OF ALL RESPONSES TO PHQ QUESTIONS 1-9: 0
SUM OF ALL RESPONSES TO PHQ QUESTIONS 1-9: 0
SUM OF ALL RESPONSES TO PHQ9 QUESTIONS 1 & 2: 0
1. LITTLE INTEREST OR PLEASURE IN DOING THINGS: NOT AT ALL
SUM OF ALL RESPONSES TO PHQ QUESTIONS 1-9: 0
SUM OF ALL RESPONSES TO PHQ QUESTIONS 1-9: 0

## 2024-06-19 NOTE — PROGRESS NOTES
Chief Complaint   Patient presents with    Follow-up           Vitals:    06/19/24 1454   BP: 127/70   Pulse: 74   Resp: 18   Temp: 97.7 °F (36.5 °C)   SpO2: 98%            1. Have you been to the ER, urgent care clinic since your last visit?  Hospitalized since your last visit?  No  2. Have you seen or consulted any other health care providers outside of the [facility] since your last visit?  Include any pap smears or colon screening. No

## 2024-06-19 NOTE — PROGRESS NOTES
[facility] Progress Note    Date: 2024    Name: Kenroy Reno    MRN: [id number]         : 1965    Mr. Reno Arrived ambulatory and in no distress for PF/Lupron/Xgeva. Port accessed without difficulty, + blood return.         Mr. Reno's vitals were reviewed.  Vitals:    24 1400   BP: 123/65   Pulse: 69   Resp: 18   Temp: 97.5 °F (36.4 °C)   SpO2: 97%       Lab results were  reviewed and within parameters for treatment.     Medications:    Medications Administered         denosumab (XGEVA) SC injection 120 mg Admin Date  2024 Action  Given Dose  120 mg Route  SubCUTAneous Administered By  Ashley Antunez RN        leuprolide (LUPRON) injection 22.5 mg Admin Date  2024 Action  Given Dose  22.5 mg Route  IntraMUSCular Administered By  Ashley Antunez RN               Mr. Reno tolerated treatment well and [facility].   Port flushed and de accessed per protocol. He is aware of next appointment.    Ashley Antunez RN  2024  Future Appointments   Date Time Provider Department Center   2024  2:00 PM ANNY PET DOSE 1 [facility]   2024  3:00 PM ANNY PET 1 [facility]   2024  3:00 PM Eula Morin MD [facility]   2024  2:00 PM SS FASTTRACK 1 [facility]   2024  2:15 PM [facility]   2024  2:00 PM SS FASTTRACK 1 [facility]   2024  2:00 PM SS FASTTRACK 1 [facility]

## 2024-06-20 ENCOUNTER — TELEPHONE (OUTPATIENT)
Age: 59
End: 2024-06-20

## 2024-06-20 RX ORDER — ABIRATERONE ACETATE 250 MG/1
1000 TABLET ORAL DAILY
Qty: 120 TABLET | Refills: 2 | Status: ACTIVE | OUTPATIENT
Start: 2024-06-20

## 2024-06-20 NOTE — TELEPHONE ENCOUNTER
Patients wife would like to know if this patient will be seeing Dr. Marion on the same days as his infusion appointments for 8/22/24 and 9/19/24. Please advise.    # [phone]

## 2024-06-21 NOTE — TELEPHONE ENCOUNTER
Lm with patients wife explaining the nurses verohart message from yesterday afternoon that Dr. Marion will decide at the patients next appointment when his next office visit needs to be. Gave her office number to call back with any questions.

## 2024-06-21 NOTE — TELEPHONE ENCOUNTER
Patients wife called back and I spoke with her about the patients infusions for August and September. I let her know that Dr. Marion will decide at the patients next appointment on 7/24 if he would like to see him same day as his infusions for August and September, I let the patients wife know we can then adjust the appointment times as needed. She verbalized understanding and had no further questions.

## 2024-06-24 PROBLEM — T50.905A HOT FLASH DUE TO MEDICATION: Status: ACTIVE | Noted: 2024-06-24

## 2024-06-24 PROBLEM — Z79.899 HIGH RISK MEDICATION USE: Status: ACTIVE | Noted: 2024-06-24

## 2024-06-24 PROBLEM — R23.2 HOT FLASH DUE TO MEDICATION: Status: ACTIVE | Noted: 2024-06-24

## 2024-06-24 NOTE — PROGRESS NOTES
Cancer [place] at [facility]  [address], [address] [facility] [address]  W: [phone]  F: [phone] Patient ID  Name: Kenroy Reno  YOB: 1965  MRN: [id number]  Referring Provider:   No referring provider defined for this encounter.  Primary Care Provider:   Jose Shanks MD       HEMATOLOGY/MEDICAL ONCOLOGY  NOTE     Reason for Evaluation:     Chief Complaint   Patient presents with    Follow-up     Oncology History:   Please review original records for clinical decision making. This summary highlights focused aspects of patient's ongoing care and may have a recurring section in notes with either updates or remain unchanged as a longitudinal care summary.  ______________________________________________________  DIAGNOSIS: METASTATIC/RECURRENT PROSTATE CANCER  <>PATHOLOGY<>  [id number] PROSTATE BIOPSY;  [facility]  2/3/2011  ADENOCARCINOMA (PIA RIGHT MID LATERAL, RIGHT BASE MEDIAL, RIGHT BASE LATERAL:  (PIA 4+5=9)     <>STAGING<> RECURRENT DISEASE, Bone Disease, lymph nodes mediastinal,bilateral hilar. Extensive bone disease in thoracic, Lumbar, appendicular skeleton..   Cancer Staging   No matching staging information was found for the patient.     <>GOALS OF CARE<>PALLIATIVE INTENT  <>CURRENT TREATMENT<>Abiraterone 1000mg ,Prednisone 5mg every 12h  <>PRIOR TREATMENT> Per outside Records ([facility] note from 4/22/20224 (Dr. Price): Prostatectomy 3/2011 (Dr. Sosa). Rise in PSA 7/2014, ADT 2014.    Abitraterone/Prednisone 1/2023--? Completed 6 cycles of taxotere from 1/2023. Started Xgeva 7/2023.  <>BIOMARKERS<>  -- PSA 0.6 from 4/15/24  0.5 9/7/23,  1.5 on 4/24/23, PSA 3.0 from 1/30/23. PSA down to 1.7 on 3/13/23.  PSMA PET on 7/23 reportedly showed treatment response.    Subjective:     History of Present Illness:   Date of Visit: 06/19/24   Kenroy Reno is a 59 y.o. male who presents for follow-up management for PALLIATIVE INTENT systemic therapy

## 2024-06-26 ENCOUNTER — HOSPITAL ENCOUNTER (OUTPATIENT)
Facility: HOSPITAL | Age: 59
Discharge: HOME | End: 2024-06-29
Attending: INTERNAL MEDICINE
Payer: COMMERCIAL

## 2024-06-26 DIAGNOSIS — C79.51 SECONDARY CARCINOMA OF BONE (HCC): ICD-10-CM

## 2024-06-26 PROCEDURE — 3430000000 HC RX DIAGNOSTIC RADIOPHARMACEUTICAL: Performed by: INTERNAL MEDICINE

## 2024-06-26 PROCEDURE — 78815 PET IMAGE W/CT SKULL-THIGH: CPT

## 2024-06-26 PROCEDURE — A9595 HC RX DIAGNOSTIC RADIOPHARMACEUTICAL: HCPCS | Performed by: INTERNAL MEDICINE

## 2024-06-26 RX ADMIN — PIFLUFOLASTAT F-18 9 MILLICURIE: 80 INJECTION INTRAVENOUS at 13:34

## 2024-06-27 ENCOUNTER — TELEMEDICINE (OUTPATIENT)
Age: 59
End: 2024-06-27
Payer: COMMERCIAL

## 2024-06-27 DIAGNOSIS — R64 MALIGNANT CACHEXIA (HCC): ICD-10-CM

## 2024-06-27 DIAGNOSIS — F17.200 TOBACCO DEPENDENCE: ICD-10-CM

## 2024-06-27 DIAGNOSIS — R53.0 NEOPLASTIC (MALIGNANT) RELATED FATIGUE: ICD-10-CM

## 2024-06-27 DIAGNOSIS — F43.21 ADJUSTMENT DISORDER WITH DEPRESSED MOOD: ICD-10-CM

## 2024-06-27 DIAGNOSIS — G47.9 SLEEP DISTURBANCE: Primary | ICD-10-CM

## 2024-06-27 PROCEDURE — 99214 OFFICE O/P EST MOD 30 MIN: CPT | Performed by: INTERNAL MEDICINE

## 2024-06-27 RX ORDER — SERTRALINE HYDROCHLORIDE 25 MG/1
25 TABLET, FILM COATED ORAL DAILY
Qty: 90 TABLET | Refills: 1 | Status: SHIPPED | COMMUNITY
Start: 2024-06-27

## 2024-06-27 NOTE — PROGRESS NOTES
Palliative Medicine Outpatient Clinic  Nurse Check in Note  [phone]    Patient Name: Kenroy Reno  YOB: 1965      Date of Visit: 06/27/2024  Visit Location:  [facility] Virtual Visit     Chief patient or family concern today: follow up.  Discuss PET scan results which have not been reviewed    Patient's Last Palliative Medicine Clinic Visit Date:  5/23/2024    Have you been to an ER or urgent care center since your last visit?  No  Have you been hospitalized since your last visit? No  Have you seen or consulted any health care providers outside of the [facility] system since your last visit?  No  If Yes, alert PSR to request appropriate records from non-[facility] offices    Medications:  Med reconciliation was performed with:  Patient    Requested refills:  None    If prescribed an opioid, does patient have access to naloxone at home?:  NA - Pt is not taking or prescribed opioids at this time  If No, pend naloxone nasal spray    Function and Symptoms:  Use of assist devices:  None    Palliative Performance Status (PPS):   Palliative Performance Scale (PPS)  PPS: 70    ESAS:  Modified-[place] Symptom Assessment Scale (ESAS)  Tiredness Score: 5  Drowsiness Score: 3  Depression Score: 3  Pain Score: 4  Anxiety Score: 5  Nausea Score: Not nauseated  Appetite Score: 2  Dyspnea Score: No shortness of breath  Constipation: No  Wellbeing Score: 4    Constipation?  No  Last BM: 06/26/24    Advance Care Planning:  Currently listed healthcare agent: Claribel Reno -[phone]      Is there an ACP Note within the past 12 months?  YES  If No, Alert Clinician and/or Social Work      Erika Jeffers LPN

## 2024-06-27 NOTE — PROGRESS NOTES
Palliative Medicine Outpatient Services  [place]: [phone]    Patient Name: Kenroy Reno  YOB: 1965    Date of Current Visit: 06/27/2024  Location of Current Visit:    [x] [facility] Office  [] [facility] Office  [] [facility] Office  [] Home  [] Synchronous real-time A/V virtual visit    Date of Initial Palliative Medicine Visit: 5/23/24   Referral from: Dr. Marion    REASON FOR VISIT:   [x] Establish care   [] Follow up   [] Urgent     FOLLOW UP:   Return in about 1 month (around 7/27/2024) for following sleep study.     ASSESSMENT AND PLAN:     Sleep disturbance  - nocturnal waking, feels un refreshed in am; h/o snoring.  Multiple risk factors for sleep apnea.   - today patient in agreement to pursue a sleep study, prefers locations closer to his home and will only agree to a home sleep test.     - referral to [facility] location for consultation with their sleep physician.   - in meantime- trial of APAP 1000 mg HS to address any discomfort coming from bone pain which may be contributing to waking.   - hold off on trial of new approach for hot flash as pt does not think this is primary .   - now taking prednisone at bedtime, 5 mg, but sleep was problematic before this addition    Adjustment disorder w/ depressed mood- improved  - Sx include fatigue, anhedonia, loss of appetite and wt loss.    - Initiated on Zoloft 25 mg daily by his endocrinologist Dr. Zendejas in early May  - He did not tolerate an increase to 50 mg (foggy / fatigue)  - Recommend to r/o other possible contributing factors to fatigue and anhedonia as noted below  - Offered palliative medicine social work support w/ Fide Gilmore at initial visit, MSW; he declined for now.  Do suspect underlying stress and worry as his spouse is also undergoing cancer care.     - Recommended \"[facility] programs\"- support retreats for men with cancer at initial visit.   - continue Zoloft 25 mg HS    Anorexia and Cachexia  - 7.6% wt loss

## 2024-06-28 ENCOUNTER — TELEPHONE (OUTPATIENT)
Age: 59
End: 2024-06-28

## 2024-06-28 NOTE — TELEPHONE ENCOUNTER
Per Dr. Morin pt is to see PAR in [facility].  Info sent to pt via My Chart for pt to call and make an appt.

## 2024-07-02 ENCOUNTER — TELEPHONE (OUTPATIENT)
Age: 59
End: 2024-07-02

## 2024-07-02 NOTE — TELEPHONE ENCOUNTER
[facility] [place] at [facility]  [phone]    07/02/24 4:40 PM EDT - Called and spoke with the patient.  Patient's ID verified x 2.  Advised, per Dr. Marion, he has not been able to connect with the radiologist yet but is still trying to connect with her.  Advised, per Dr. Marion, they were able to compare the PET scan with his old scans except for his bones.  Advised Dr. Marion recommends we keep the plan as is and keep monitoring his PSA.  The patient voiced understanding and gratitude for the call.  No further questions or concerns.

## 2024-07-02 NOTE — TELEPHONE ENCOUNTER
Pt called in looking to get an update on the clarification for his pet scan results.    CB# [phone]

## 2024-07-17 ENCOUNTER — APPOINTMENT (OUTPATIENT)
Facility: HOSPITAL | Age: 59
End: 2024-07-17
Payer: COMMERCIAL

## 2024-07-23 DIAGNOSIS — C79.51 SECONDARY CARCINOMA OF BONE (HCC): ICD-10-CM

## 2024-07-24 ENCOUNTER — HOSPITAL ENCOUNTER (OUTPATIENT)
Facility: HOSPITAL | Age: 59
Setting detail: INFUSION SERIES
Discharge: HOME | End: 2024-07-24
Payer: COMMERCIAL

## 2024-07-24 ENCOUNTER — OFFICE VISIT (OUTPATIENT)
Age: 59
End: 2024-07-24
Payer: COMMERCIAL

## 2024-07-24 VITALS
TEMPERATURE: 97 F | BODY MASS INDEX: 33.22 KG/M2 | RESPIRATION RATE: 18 BRPM | HEIGHT: 68 IN | WEIGHT: 219.2 LBS | OXYGEN SATURATION: 97 % | DIASTOLIC BLOOD PRESSURE: 62 MMHG | SYSTOLIC BLOOD PRESSURE: 132 MMHG | HEART RATE: 75 BPM

## 2024-07-24 VITALS
DIASTOLIC BLOOD PRESSURE: 68 MMHG | HEIGHT: 68 IN | BODY MASS INDEX: 33.19 KG/M2 | HEART RATE: 82 BPM | SYSTOLIC BLOOD PRESSURE: 130 MMHG | TEMPERATURE: 97.3 F | OXYGEN SATURATION: 98 % | WEIGHT: 219 LBS | RESPIRATION RATE: 16 BRPM

## 2024-07-24 DIAGNOSIS — R45.89 ANXIETY ABOUT HEALTH: ICD-10-CM

## 2024-07-24 DIAGNOSIS — C79.51 PROSTATE CANCER METASTATIC TO BONE (HCC): Primary | ICD-10-CM

## 2024-07-24 DIAGNOSIS — Z79.818 ANDROGEN DEPRIVATION THERAPY: ICD-10-CM

## 2024-07-24 DIAGNOSIS — C61 PROSTATE CANCER METASTATIC TO BONE (HCC): Primary | ICD-10-CM

## 2024-07-24 DIAGNOSIS — Z79.899 HIGH RISK MEDICATION USE: ICD-10-CM

## 2024-07-24 DIAGNOSIS — C79.51 SECONDARY CARCINOMA OF BONE (HCC): Primary | ICD-10-CM

## 2024-07-24 DIAGNOSIS — R23.2 HOT FLASH DUE TO MEDICATION: ICD-10-CM

## 2024-07-24 DIAGNOSIS — T50.905A HOT FLASH DUE TO MEDICATION: ICD-10-CM

## 2024-07-24 LAB
ANION GAP BLD CALC-SCNC: 10.7 MMOL/L (ref 10–20)
CA-I BLD-MCNC: 1.23 MMOL/L (ref 1.12–1.32)
CHLORIDE BLD-SCNC: 102 MMOL/L (ref 98–107)
CO2 BLD-SCNC: 26.3 MMOL/L (ref 21–32)
CREAT BLD-MCNC: 0.58 MG/DL (ref 0.6–1.3)
GLUCOSE BLD-MCNC: 216 MG/DL (ref 70–110)
MAGNESIUM SERPL-MCNC: 1.8 MG/DL (ref 1.6–2.4)
PHOSPHATE SERPL-MCNC: 2.4 MG/DL (ref 2.6–4.7)
POTASSIUM BLD-SCNC: 4.1 MMOL/L (ref 3.5–5.1)
PSA SERPL-MCNC: 0.8 NG/ML (ref 0.01–4)
SERVICE CMNT-IMP: ABNORMAL
SODIUM BLD-SCNC: 139 MMOL/L (ref 136–145)

## 2024-07-24 PROCEDURE — 36415 COLL VENOUS BLD VENIPUNCTURE: CPT

## 2024-07-24 PROCEDURE — 84153 ASSAY OF PSA TOTAL: CPT

## 2024-07-24 PROCEDURE — 84100 ASSAY OF PHOSPHORUS: CPT

## 2024-07-24 PROCEDURE — 99214 OFFICE O/P EST MOD 30 MIN: CPT | Performed by: INTERNAL MEDICINE

## 2024-07-24 PROCEDURE — 83735 ASSAY OF MAGNESIUM: CPT

## 2024-07-24 PROCEDURE — 6360000002 HC RX W HCPCS: Performed by: INTERNAL MEDICINE

## 2024-07-24 PROCEDURE — 96372 THER/PROPH/DIAG INJ SC/IM: CPT

## 2024-07-24 PROCEDURE — 80047 BASIC METABLC PNL IONIZED CA: CPT

## 2024-07-24 RX ORDER — ONDANSETRON 2 MG/ML
8 INJECTION INTRAMUSCULAR; INTRAVENOUS
OUTPATIENT
Start: 2024-08-14

## 2024-07-24 RX ORDER — ALBUTEROL SULFATE 90 UG/1
4 AEROSOL, METERED RESPIRATORY (INHALATION) PRN
OUTPATIENT
Start: 2024-08-14

## 2024-07-24 RX ORDER — SODIUM CHLORIDE 9 MG/ML
INJECTION, SOLUTION INTRAVENOUS CONTINUOUS
OUTPATIENT
Start: 2024-08-14

## 2024-07-24 RX ORDER — EPINEPHRINE 1 MG/ML
0.3 INJECTION, SOLUTION INTRAMUSCULAR; SUBCUTANEOUS PRN
OUTPATIENT
Start: 2024-08-14

## 2024-07-24 RX ORDER — FAMOTIDINE 10 MG/ML
20 INJECTION, SOLUTION INTRAVENOUS
OUTPATIENT
Start: 2024-08-14

## 2024-07-24 RX ORDER — DIPHENHYDRAMINE HYDROCHLORIDE 50 MG/ML
50 INJECTION INTRAMUSCULAR; INTRAVENOUS
OUTPATIENT
Start: 2024-08-14

## 2024-07-24 RX ORDER — ACETAMINOPHEN 325 MG/1
650 TABLET ORAL
OUTPATIENT
Start: 2024-08-14

## 2024-07-24 RX ADMIN — DENOSUMAB 120 MG: 120 INJECTION SUBCUTANEOUS at 14:06

## 2024-07-24 ASSESSMENT — PAIN SCALES - GENERAL: PAINLEVEL_OUTOF10: 0

## 2024-07-24 NOTE — PROGRESS NOTES
[facility] Progress Note    Date: July 24, 2024        1330: Mr. Reno arrived ambulatory and in no distress for Xgeva Injection. Assessment was completed, pt reports some fatigue today. Peripheral labs drawn and sent for processing. Pt denies having any recent invasive dental work.    Mr. Reno's vitals were reviewed.  Patient Vitals for the past 12 hrs:   Temp Pulse Resp BP SpO2   07/24/24 1329 97 °F (36.1 °C) 75 18 132/62 97 %         Lab results were reviewed, criteria for treatment met.  Results for orders placed or performed during the hospital encounter of 07/24/24   POC CHEM 8   Result Value Ref Range    POC Ionized Calcium 1.23 1.12 - 1.32 mmol/L    POC Sodium 139 136 - 145 mmol/L    POC Potassium 4.1 3.5 - 5.1 mmol/L    POC Chloride 102 98 - 107 mmol/L    POC TCO2 26.3 21 - 32 mmol/L    Anion Gap, POC 10.7 10 - 20 mmol/L    POC Glucose 216 (H) 70 - 110 mg/dL    POC Creatinine 0.58 (L) 0.6 - 1.3 mg/dL    eGFR, POC >90 >60 ml/min/1.73m2    UA Comment Comment Not Indicated.         Medications given.  Medications Administered         denosumab (XGEVA) SC injection 120 mg Admin Date  07/24/2024 Action  Given Dose  120 mg Route  SubCUTAneous Administered By  Madison Simmons, BECKY              1400: Patient tolerated treatment well and was discharged from [facility] in stable condition. Patient is aware of next scheduled [facility] appointment on 08/22/24.      Madison Simmons RN  July 24, 2024

## 2024-07-24 NOTE — PROGRESS NOTES
Chief Complaint   Patient presents with    Follow-up           Vitals:    07/24/24 1419   BP: 130/68   Pulse: 82   Resp: 16   Temp: 97.3 °F (36.3 °C)   SpO2: 98%            1. Have you been to the ER, urgent care clinic since your last visit?  Hospitalized since your last visit?  No  2. Have you seen or consulted any other health care providers outside of the [facility] since your last visit?  Include any pap smears or colon screening. No    
brings labs today with his PSA remaining stable. Denies any new symptoms other than continued complaints about issues with sleeping. He does not complain of any severe uncontrolled pain.            Patient overall reports feeling stable.     Past Medical History:   Diagnosis Date    Anterior myocardial infarction (HCC) 07/22/2002      Past Surgical History:   Procedure Laterality Date    PROSTATECTOMY  03/11/2011      Social History     Tobacco Use    Smoking status: Every Day     Types: Cigarettes    Smokeless tobacco: Never   Substance Use Topics    Alcohol use: Not Currently      History reviewed. No pertinent family history.  Current Outpatient Medications   Medication Sig    sertraline (ZOLOFT) 25 MG tablet Take 1 tablet by mouth daily Titrating to this    abiraterone acetate (ZYTIGA) 250 MG tablet Take 4 tablets by mouth daily    predniSONE (DELTASONE) 5 MG tablet Take 1 tablet by mouth in the morning and 1 tablet in the evening.    metFORMIN (GLUCOPHAGE) 1000 MG tablet Take 1 tablet by mouth in the morning and at bedtime    glimepiride (AMARYL) 4 MG tablet Take 1 tablet by mouth every morning (before breakfast)    simvastatin (ZOCOR) 80 MG tablet Take 1 tablet by mouth daily    atenolol (TENORMIN) 50 MG tablet Take 1 tablet by mouth daily    lisinopril (PRINIVIL;ZESTRIL) 10 MG tablet Take 1 tablet by mouth daily    spironolactone (ALDACTONE) 25 MG tablet Take 1 tablet by mouth daily    aspirin 81 MG EC tablet Take 1 tablet by mouth daily    Multiple Vitamins-Minerals (MULTIVITAMIN ADULT, MINERALS, PO) Take by mouth daily Multivitamin plus calcium    Calcium Carbonate-Vitamin D (CALCIUM-VITAMIN D) 600-3.125 MG-MCG TABS Take by mouth daily    turmeric 500 MG CAPS Take 2 capsules by mouth in the morning and at bedtime    Omega-3 Fatty Acids (FISH OIL) 1000 MG capsule Take 1 capsule by mouth 2 times daily     No current facility-administered medications for this visit.      No Known Allergies   -  Review of

## 2024-07-25 ENCOUNTER — APPOINTMENT (OUTPATIENT)
Facility: HOSPITAL | Age: 59
End: 2024-07-25
Payer: COMMERCIAL

## 2024-08-14 ENCOUNTER — APPOINTMENT (OUTPATIENT)
Facility: HOSPITAL | Age: 59
End: 2024-08-14
Payer: COMMERCIAL

## 2024-08-22 ENCOUNTER — APPOINTMENT (OUTPATIENT)
Facility: HOSPITAL | Age: 59
End: 2024-08-22
Payer: COMMERCIAL

## 2024-08-23 LAB
ALBUMIN SERPL-MCNC: 4.2 G/DL (ref 3.9–4.9)
ALP SERPL-CCNC: 71 IU/L (ref 44–121)
ALT SERPL-CCNC: 17 IU/L (ref 0–44)
AST SERPL-CCNC: 17 IU/L (ref 0–40)
BILIRUB SERPL-MCNC: 0.5 MG/DL (ref 0–1.2)
BUN SERPL-MCNC: 15 MG/DL (ref 8–27)
BUN/CREAT SERPL: 19 (ref 10–24)
CALCIUM SERPL-MCNC: 9.1 MG/DL (ref 8.6–10.2)
CHLORIDE SERPL-SCNC: 102 MMOL/L (ref 96–106)
CO2 SERPL-SCNC: 25 MMOL/L (ref 20–29)
CREAT SERPL-MCNC: 0.81 MG/DL (ref 0.76–1.27)
EGFRCR SERPLBLD CKD-EPI 2021: 100 ML/MIN/1.73
GLOBULIN SER CALC-MCNC: 2.2 G/DL (ref 1.5–4.5)
GLUCOSE SERPL-MCNC: 109 MG/DL (ref 70–99)
MAGNESIUM SERPL-MCNC: 1.7 MG/DL (ref 1.6–2.3)
POTASSIUM SERPL-SCNC: 4.5 MMOL/L (ref 3.5–5.2)
PROT SERPL-MCNC: 6.4 G/DL (ref 6–8.5)
PSA SERPL-MCNC: 0.9 NG/ML (ref 0–4)
SODIUM SERPL-SCNC: 141 MMOL/L (ref 134–144)

## 2024-08-27 DIAGNOSIS — C61 PROSTATE CANCER (HCC): Primary | ICD-10-CM

## 2024-08-28 ENCOUNTER — OFFICE VISIT (OUTPATIENT)
Age: 59
End: 2024-08-28
Payer: COMMERCIAL

## 2024-08-28 ENCOUNTER — HOSPITAL ENCOUNTER (OUTPATIENT)
Facility: HOSPITAL | Age: 59
Setting detail: INFUSION SERIES
Discharge: HOME | End: 2024-08-28
Payer: COMMERCIAL

## 2024-08-28 VITALS
HEART RATE: 63 BPM | BODY MASS INDEX: 35.34 KG/M2 | RESPIRATION RATE: 18 BRPM | WEIGHT: 233.2 LBS | TEMPERATURE: 97.6 F | HEIGHT: 68 IN | OXYGEN SATURATION: 96 % | SYSTOLIC BLOOD PRESSURE: 115 MMHG | DIASTOLIC BLOOD PRESSURE: 67 MMHG

## 2024-08-28 VITALS
DIASTOLIC BLOOD PRESSURE: 60 MMHG | SYSTOLIC BLOOD PRESSURE: 110 MMHG | RESPIRATION RATE: 16 BRPM | TEMPERATURE: 98 F | HEART RATE: 67 BPM

## 2024-08-28 DIAGNOSIS — C79.51 PROSTATE CANCER METASTATIC TO BONE (HCC): Primary | ICD-10-CM

## 2024-08-28 DIAGNOSIS — C79.51 SECONDARY CARCINOMA OF BONE (HCC): Primary | ICD-10-CM

## 2024-08-28 DIAGNOSIS — C61 PROSTATE CANCER (HCC): ICD-10-CM

## 2024-08-28 DIAGNOSIS — Z79.818 ANDROGEN DEPRIVATION THERAPY: ICD-10-CM

## 2024-08-28 DIAGNOSIS — C61 PROSTATE CANCER METASTATIC TO BONE (HCC): Primary | ICD-10-CM

## 2024-08-28 DIAGNOSIS — Z79.899 HIGH RISK MEDICATION USE: ICD-10-CM

## 2024-08-28 PROCEDURE — 6360000002 HC RX W HCPCS: Performed by: INTERNAL MEDICINE

## 2024-08-28 PROCEDURE — 96372 THER/PROPH/DIAG INJ SC/IM: CPT

## 2024-08-28 PROCEDURE — 99214 OFFICE O/P EST MOD 30 MIN: CPT | Performed by: INTERNAL MEDICINE

## 2024-08-28 RX ORDER — ALBUTEROL SULFATE 90 UG/1
4 AEROSOL, METERED RESPIRATORY (INHALATION) PRN
OUTPATIENT
Start: 2024-09-15

## 2024-08-28 RX ORDER — ACETAMINOPHEN 325 MG/1
650 TABLET ORAL
OUTPATIENT
Start: 2024-09-15

## 2024-08-28 RX ORDER — EPINEPHRINE 1 MG/ML
0.3 INJECTION, SOLUTION INTRAMUSCULAR; SUBCUTANEOUS PRN
OUTPATIENT
Start: 2024-09-15

## 2024-08-28 RX ORDER — ONDANSETRON 2 MG/ML
8 INJECTION INTRAMUSCULAR; INTRAVENOUS
OUTPATIENT
Start: 2024-09-15

## 2024-08-28 RX ORDER — FAMOTIDINE 10 MG/ML
20 INJECTION, SOLUTION INTRAVENOUS
OUTPATIENT
Start: 2024-09-15

## 2024-08-28 RX ORDER — SODIUM CHLORIDE 9 MG/ML
INJECTION, SOLUTION INTRAVENOUS CONTINUOUS
OUTPATIENT
Start: 2024-09-15

## 2024-08-28 RX ORDER — DIPHENHYDRAMINE HYDROCHLORIDE 50 MG/ML
50 INJECTION INTRAMUSCULAR; INTRAVENOUS
OUTPATIENT
Start: 2024-09-15

## 2024-08-28 RX ADMIN — DENOSUMAB 120 MG: 120 INJECTION SUBCUTANEOUS at 14:15

## 2024-08-28 ASSESSMENT — PAIN SCALES - GENERAL: PAINLEVEL_OUTOF10: 0

## 2024-08-28 NOTE — PROGRESS NOTES
[facility]cer [place] at [facility]  [address], [address] [place] [facility] [address]  W: [phone]  F: [phone] Patient ID  Name: Kenroy Reno  YOB: 1965  MRN: [id number]  Referring Provider:   No referring provider defined for this encounter.  Primary Care Provider:   Jose Shanks MD       HEMATOLOGY/MEDICAL ONCOLOGY  NOTE     Reason for Evaluation:     Chief Complaint   Patient presents with    Follow-up     Oncology History:   Please review original records for clinical decision making. This summary highlights focused aspects of patient's ongoing care and may have a recurring section in notes with either updates or remain unchanged as a longitudinal care summary.  ______________________________________________________  DIAGNOSIS: METASTATIC/RECURRENT PROSTATE CANCER  <>PATHOLOGY<>  [id number] PROSTATE BIOPSY;  [facility]  2/3/2011  ADENOCARCINOMA (PIA RIGHT MID LATERAL, RIGHT BASE MEDIAL, RIGHT BASE LATERAL:  (PIA 4+5=9)     <>STAGING<> RECURRENT DISEASE, Bone Disease, lymph nodes mediastinal,bilateral hilar. Extensive bone disease in thoracic, Lumbar, appendicular skeleton..   Cancer Staging   No matching staging information was found for the patient.       <>GOALS OF CARE<>PALLIATIVE INTENT  <>CURRENT TREATMENT<>Abiraterone 1000mg ,Prednisone 5mg every 12h  <>PRIOR TREATMENT> Per outside Records ([facility] note from 4/22/20224 (Dr. Price): Prostatectomy 3/2011 (Dr. Sosa). Rise in PSA 7/2014, ADT 2014.    Abitraterone/Prednisone 1/2023--? Completed 6 cycles of taxotere from 1/2023. Started Xgeva 7/2023.  <>BIOMARKERS<>  -- PSA 0.6 from 4/15/24  0.5 9/7/23,  1.5 on 4/24/23, PSA 3.0 from 1/30/23. PSA down to 1.7 on 3/13/23.  PSMA PET on 7/23 reportedly showed treatment response.    Subjective:     History of Present Illness:   DATE OF VISIT: 08/28/24   Kenroy Reno is a 59 y.o. male who presents for follow-up management for PALLIATIVE INTENT systemic therapy  for PROSTATE CANCER.             Patient overall reports feeling stable.     Past Medical History:   Diagnosis Date    Anterior myocardial infarction (HCC) 07/22/2002      Past Surgical History:   Procedure Laterality Date    PROSTATECTOMY  03/11/2011      Social History     Tobacco Use    Smoking status: Every Day     Types: Cigarettes    Smokeless tobacco: Never   Substance Use Topics    Alcohol use: Not Currently      History reviewed. No pertinent family history.  Current Outpatient Medications   Medication Sig    sertraline (ZOLOFT) 25 MG tablet Take 1 tablet by mouth daily Titrating to this    abiraterone acetate (ZYTIGA) 250 MG tablet Take 4 tablets by mouth daily    predniSONE (DELTASONE) 5 MG tablet Take 1 tablet by mouth in the morning and 1 tablet in the evening.    metFORMIN (GLUCOPHAGE) 1000 MG tablet Take 1 tablet by mouth in the morning and at bedtime    glimepiride (AMARYL) 4 MG tablet Take 1 tablet by mouth every morning (before breakfast)    simvastatin (ZOCOR) 80 MG tablet Take 1 tablet by mouth daily    atenolol (TENORMIN) 50 MG tablet Take 1 tablet by mouth daily    lisinopril (PRINIVIL;ZESTRIL) 10 MG tablet Take 1 tablet by mouth daily    spironolactone (ALDACTONE) 25 MG tablet Take 1 tablet by mouth daily    aspirin 81 MG EC tablet Take 1 tablet by mouth daily    Multiple Vitamins-Minerals (MULTIVITAMIN ADULT, MINERALS, PO) Take by mouth daily Multivitamin plus calcium    Calcium Carbonate-Vitamin D (CALCIUM-VITAMIN D) 600-3.125 MG-MCG TABS Take by mouth daily    turmeric 500 MG CAPS Take 2 capsules by mouth in the morning and at bedtime    Omega-3 Fatty Acids (FISH OIL) 1000 MG capsule Take 1 capsule by mouth 2 times daily     No current facility-administered medications for this visit.      No Known Allergies   -  Review of Systems Provided by:  Patient  Review of Systems: A complete review of systems was obtained, reviewed.  Pertinent findings reviewed above.  Past medical history, social

## 2024-08-28 NOTE — PROGRESS NOTES
Chief Complaint   Patient presents with    Follow-up           Vitals:    08/28/24 1433   BP: 115/67   Pulse: 63   Resp: 18   Temp: 97.6 °F (36.4 °C)   SpO2: 96%            1. Have you been to the ER, urgent care clinic since your last visit?  Hospitalized since your last visit?  No  2. Have you seen or consulted any other health care providers outside of the [facility] since your last visit?  Include any pap smears or colon screening. No

## 2024-08-28 NOTE — PROGRESS NOTES
[facility] Progress Note    Pt admit to [facility] for Xgeva in stable condition. Assessment completed.     No new concerns voiced.    Pt denies having recent dental procedures in the past 4-6 weeks and states no plans for any in the near future. Pt verbalized understanding importance of notifying dentist of taking Xgeva.  Pt denies jaw swelling or pain and recent onset/increased pain of the hips, groin or thigh.      VS  Vitals:    08/28/24 1406   BP: 110/60   Pulse: 67   Resp: 16   Temp: 98 °F (36.7 °C)         Medications:  Xgeva 120mg SC to L arm      Pt tolerated treatment well. D/c home in no distress. Pt aware of next appointment scheduled for 9/19.    Labs drawn 8/22.  Results reviewed.

## 2024-09-03 ENCOUNTER — TELEPHONE (OUTPATIENT)
Age: 59
End: 2024-09-03

## 2024-09-03 NOTE — TELEPHONE ENCOUNTER
Spoke with patients wife about labs orders for 9/27/24 visit and let her know they are already in the system, she had no further questions.

## 2024-09-11 ENCOUNTER — APPOINTMENT (OUTPATIENT)
Facility: HOSPITAL | Age: 59
End: 2024-09-11
Payer: COMMERCIAL

## 2024-09-17 ENCOUNTER — HOSPITAL ENCOUNTER (OUTPATIENT)
Facility: HOSPITAL | Age: 59
Discharge: HOME | End: 2024-09-20
Payer: COMMERCIAL

## 2024-09-17 VITALS — BODY MASS INDEX: 35.43 KG/M2 | WEIGHT: 233 LBS

## 2024-09-17 DIAGNOSIS — C79.51 SECONDARY MALIGNANT NEOPLASM OF BONE (HCC): ICD-10-CM

## 2024-09-17 PROCEDURE — 72158 MRI LUMBAR SPINE W/O & W/DYE: CPT

## 2024-09-17 PROCEDURE — 6360000004 HC RX CONTRAST MEDICATION: Performed by: RADIOLOGY

## 2024-09-17 PROCEDURE — A9579 GAD-BASE MR CONTRAST NOS,1ML: HCPCS | Performed by: RADIOLOGY

## 2024-09-17 PROCEDURE — 72157 MRI CHEST SPINE W/O & W/DYE: CPT

## 2024-09-17 RX ADMIN — GADOTERIDOL 20 ML: 279.3 INJECTION, SOLUTION INTRAVENOUS at 16:31

## 2024-09-18 DIAGNOSIS — C61 PROSTATE CANCER (HCC): ICD-10-CM

## 2024-09-19 ENCOUNTER — APPOINTMENT (OUTPATIENT)
Facility: HOSPITAL | Age: 59
End: 2024-09-19
Payer: COMMERCIAL

## 2024-09-20 RX ORDER — ABIRATERONE ACETATE 250 MG/1
TABLET ORAL
Qty: 120 TABLET | Refills: 2 | Status: ACTIVE | OUTPATIENT
Start: 2024-09-20

## 2024-09-24 ENCOUNTER — INITIAL CONSULT (OUTPATIENT)
Age: 59
End: 2024-09-24

## 2024-09-24 DIAGNOSIS — C61 PROSTATE CANCER METASTATIC TO BONE (HCC): ICD-10-CM

## 2024-09-24 DIAGNOSIS — Z80.0 FAMILY HISTORY OF COLON CANCER: ICD-10-CM

## 2024-09-24 DIAGNOSIS — Z13.71 ENCOUNTER FOR NONPROCREATIVE GENETIC COUNSELING AND TESTING: ICD-10-CM

## 2024-09-24 DIAGNOSIS — C79.51 PROSTATE CANCER METASTATIC TO BONE (HCC): ICD-10-CM

## 2024-09-24 DIAGNOSIS — Z80.3 FAMILY HISTORY OF MALIGNANT NEOPLASM OF BREAST: ICD-10-CM

## 2024-09-24 DIAGNOSIS — Z71.83 ENCOUNTER FOR NONPROCREATIVE GENETIC COUNSELING AND TESTING: ICD-10-CM

## 2024-09-24 DIAGNOSIS — Z80.42 FAMILY HISTORY OF MALIGNANT NEOPLASM OF PROSTATE: ICD-10-CM

## 2024-09-24 DIAGNOSIS — C61 PROSTATE CANCER (HCC): Primary | ICD-10-CM

## 2024-09-25 ENCOUNTER — TELEPHONE (OUTPATIENT)
Age: 59
End: 2024-09-25

## 2024-09-26 ENCOUNTER — TELEPHONE (OUTPATIENT)
Age: 59
End: 2024-09-26

## 2024-09-27 ENCOUNTER — OFFICE VISIT (OUTPATIENT)
Age: 59
End: 2024-09-27
Payer: COMMERCIAL

## 2024-09-27 ENCOUNTER — CLINICAL DOCUMENTATION (OUTPATIENT)
Age: 59
End: 2024-09-27

## 2024-09-27 ENCOUNTER — HOSPITAL ENCOUNTER (OUTPATIENT)
Facility: HOSPITAL | Age: 59
Setting detail: INFUSION SERIES
Discharge: HOME | End: 2024-09-27
Payer: COMMERCIAL

## 2024-09-27 ENCOUNTER — PATIENT MESSAGE (OUTPATIENT)
Age: 59
End: 2024-09-27

## 2024-09-27 VITALS
WEIGHT: 236.5 LBS | SYSTOLIC BLOOD PRESSURE: 126 MMHG | OXYGEN SATURATION: 95 % | HEIGHT: 68 IN | HEART RATE: 66 BPM | TEMPERATURE: 98 F | BODY MASS INDEX: 35.84 KG/M2 | RESPIRATION RATE: 18 BRPM | DIASTOLIC BLOOD PRESSURE: 70 MMHG

## 2024-09-27 VITALS
HEIGHT: 68 IN | BODY MASS INDEX: 35.77 KG/M2 | DIASTOLIC BLOOD PRESSURE: 66 MMHG | WEIGHT: 236 LBS | OXYGEN SATURATION: 98 % | SYSTOLIC BLOOD PRESSURE: 114 MMHG | RESPIRATION RATE: 18 BRPM | HEART RATE: 66 BPM | TEMPERATURE: 97.3 F

## 2024-09-27 DIAGNOSIS — C79.51 PROSTATE CANCER METASTATIC TO BONE (HCC): Primary | ICD-10-CM

## 2024-09-27 DIAGNOSIS — C61 PROSTATE CANCER METASTATIC TO BONE (HCC): Primary | ICD-10-CM

## 2024-09-27 DIAGNOSIS — Z79.899 HIGH RISK MEDICATION USE: ICD-10-CM

## 2024-09-27 DIAGNOSIS — Z79.818 ANDROGEN DEPRIVATION THERAPY: ICD-10-CM

## 2024-09-27 LAB
ANION GAP BLD CALC-SCNC: 6.8 MMOL/L (ref 10–20)
CA-I BLD-MCNC: 1.16 MMOL/L (ref 1.15–1.33)
CHLORIDE BLD-SCNC: 102 MMOL/L (ref 98–107)
CO2 BLD-SCNC: 27.2 MMOL/L (ref 21–32)
CREAT BLD-MCNC: 0.7 MG/DL (ref 0.6–1.3)
GLUCOSE BLD-MCNC: 254 MG/DL (ref 74–99)
MAGNESIUM SERPL-MCNC: 1.8 MG/DL (ref 1.6–2.4)
PHOSPHATE SERPL-MCNC: 2.3 MG/DL (ref 2.6–4.7)
POTASSIUM BLD-SCNC: 4.2 MMOL/L (ref 3.5–5.1)
PSA SERPL-MCNC: 1 NG/ML (ref 0.01–4)
SERVICE CMNT-IMP: ABNORMAL
SODIUM BLD-SCNC: 136 MMOL/L (ref 136–145)

## 2024-09-27 PROCEDURE — 84153 ASSAY OF PSA TOTAL: CPT

## 2024-09-27 PROCEDURE — 6360000002 HC RX W HCPCS: Performed by: INTERNAL MEDICINE

## 2024-09-27 PROCEDURE — 96372 THER/PROPH/DIAG INJ SC/IM: CPT

## 2024-09-27 PROCEDURE — 84100 ASSAY OF PHOSPHORUS: CPT

## 2024-09-27 PROCEDURE — 96402 CHEMO HORMON ANTINEOPL SQ/IM: CPT

## 2024-09-27 PROCEDURE — 80047 BASIC METABLC PNL IONIZED CA: CPT

## 2024-09-27 PROCEDURE — 83735 ASSAY OF MAGNESIUM: CPT

## 2024-09-27 PROCEDURE — 99214 OFFICE O/P EST MOD 30 MIN: CPT | Performed by: INTERNAL MEDICINE

## 2024-09-27 RX ORDER — FAMOTIDINE 10 MG/ML
20 INJECTION, SOLUTION INTRAVENOUS
OUTPATIENT
Start: 2024-10-25

## 2024-09-27 RX ORDER — DIPHENHYDRAMINE HYDROCHLORIDE 50 MG/ML
50 INJECTION INTRAMUSCULAR; INTRAVENOUS
OUTPATIENT
Start: 2024-12-06

## 2024-09-27 RX ORDER — ACETAMINOPHEN 325 MG/1
650 TABLET ORAL
OUTPATIENT
Start: 2024-12-06

## 2024-09-27 RX ORDER — EPINEPHRINE 1 MG/ML
0.3 INJECTION, SOLUTION INTRAMUSCULAR; SUBCUTANEOUS PRN
OUTPATIENT
Start: 2024-12-06

## 2024-09-27 RX ORDER — ALBUTEROL SULFATE 90 UG/1
4 INHALANT RESPIRATORY (INHALATION) PRN
OUTPATIENT
Start: 2024-10-25

## 2024-09-27 RX ORDER — FAMOTIDINE 10 MG/ML
20 INJECTION, SOLUTION INTRAVENOUS
OUTPATIENT
Start: 2024-12-06

## 2024-09-27 RX ORDER — ONDANSETRON 2 MG/ML
8 INJECTION INTRAMUSCULAR; INTRAVENOUS
OUTPATIENT
Start: 2024-12-06

## 2024-09-27 RX ORDER — ALBUTEROL SULFATE 90 UG/1
4 INHALANT RESPIRATORY (INHALATION) PRN
OUTPATIENT
Start: 2024-12-06

## 2024-09-27 RX ORDER — ONDANSETRON 2 MG/ML
8 INJECTION INTRAMUSCULAR; INTRAVENOUS
OUTPATIENT
Start: 2024-10-25

## 2024-09-27 RX ORDER — SODIUM CHLORIDE 9 MG/ML
INJECTION, SOLUTION INTRAVENOUS CONTINUOUS
OUTPATIENT
Start: 2024-12-06

## 2024-09-27 RX ORDER — ACETAMINOPHEN 325 MG/1
650 TABLET ORAL
OUTPATIENT
Start: 2024-10-25

## 2024-09-27 RX ORDER — DIPHENHYDRAMINE HYDROCHLORIDE 50 MG/ML
50 INJECTION INTRAMUSCULAR; INTRAVENOUS
OUTPATIENT
Start: 2024-10-25

## 2024-09-27 RX ORDER — SODIUM CHLORIDE 9 MG/ML
INJECTION, SOLUTION INTRAVENOUS CONTINUOUS
OUTPATIENT
Start: 2024-10-25

## 2024-09-27 RX ORDER — EPINEPHRINE 1 MG/ML
0.3 INJECTION, SOLUTION INTRAMUSCULAR; SUBCUTANEOUS PRN
OUTPATIENT
Start: 2024-10-25

## 2024-09-27 RX ADMIN — DENOSUMAB 120 MG: 120 INJECTION SUBCUTANEOUS at 14:47

## 2024-09-27 RX ADMIN — LEUPROLIDE ACETATE 22.5 MG: KIT at 14:41

## 2024-09-27 ASSESSMENT — PAIN DESCRIPTION - ORIENTATION: ORIENTATION: LOWER

## 2024-09-27 ASSESSMENT — PAIN SCALES - GENERAL: PAINLEVEL_OUTOF10: 7

## 2024-09-27 ASSESSMENT — PAIN DESCRIPTION - LOCATION: LOCATION: BACK

## 2024-09-27 ASSESSMENT — PAIN DESCRIPTION - DESCRIPTORS: DESCRIPTORS: ACHING

## 2024-09-27 NOTE — PROGRESS NOTES
[facility] [place] at [facility]  [address], [address] [facility] [address]  W: [phone]  F: [phone] Patient ID  Name: Kenroy Reno  YOB: 1965  MRN: [id number]  Referring Provider:   No referring provider defined for this encounter.  Primary Care Provider:   Jose Shanks MD       HEMATOLOGY/MEDICAL ONCOLOGY  NOTE     Reason for Evaluation:     Chief Complaint   Patient presents with    Follow-up     Oncology History:   Please review original records for clinical decision making. This summary highlights focused aspects of patient's ongoing care and may have a recurring section in notes with either updates or remain unchanged as a longitudinal care summary.  ______________________________________________________  DIAGNOSIS: METASTATIC/RECURRENT PROSTATE CANCER  <>PATHOLOGY<>  [id number] PROSTATE BIOPSY;  [facility]  2/3/2011  ADENOCARCINOMA (PIA RIGHT MID LATERAL, RIGHT BASE MEDIAL, RIGHT BASE LATERAL:  (PIA 4+5=9)     <>STAGING<> RECURRENT DISEASE, Bone Disease, lymph nodes mediastinal,bilateral hilar. Extensive bone disease in thoracic, Lumbar, appendicular skeleton..   Cancer Staging   No matching staging information was found for the patient.       <>GOALS OF CARE<>PALLIATIVE INTENT  <>CURRENT TREATMENT<>Abiraterone 1000mg ,Prednisone 5mg every 12h  <>PRIOR TREATMENT> Per outside Records ([facility] note from 4/22/20224 (Dr. Price): Prostatectomy 3/2011 (Dr. Sosa). Rise in PSA 7/2014, ADT 2014.    Abitraterone/Prednisone 1/2023--? Completed 6 cycles of taxotere from 1/2023. Started Xgeva 7/2023.  <>BIOMARKERS<>  -- PSA 0.6 from 4/15/24  0.5 9/7/23,  1.5 on 4/24/23, PSA 3.0 from 1/30/23. PSA down to 1.7 on 3/13/23.  PSMA PET on 7/23 reportedly showed treatment response.    Subjective:     History of Present Illness:   DATE OF VISIT: 09/27/24   Kenroy Reno is a 59 y.o. male who presents as a work-in for issues related to his bone cancer. He is having

## 2024-09-27 NOTE — PROGRESS NOTES
\[facility]\"" Progress Note    Date: 2024    Name: Kenroy Reno    MRN: [id number]         : 1965    Mr. Reno Arrived ambulatory and in no distress for Lupron Injection+Xgeva.  Assessment was completed, no acute issues at this time, no new complaints voiced. Left chest wall port accessed by Sandy Person RN. Labs drawn and sent for processing.       Mr. Reno's vitals were reviewed.  Vitals:    24 1415   BP: 126/70   Pulse: 66   Resp: 18   Temp: 98 °F (36.7 °C)   SpO2: 95%     Recent Results (from the past 12 hour(s))   POC CHEM 8    Collection Time: 24  2:37 PM   Result Value Ref Range    POC Ionized Calcium 1.16 1.15 - 1.33 mmol/L    POC Sodium 136 136 - 145 mmol/L    POC Potassium 4.2 3.5 - 5.1 mmol/L    POC Chloride 102 98 - 107 mmol/L    POC TCO2 27.2 21 - 32 mmol/L    Anion Gap, POC 6.8 (L) 10 - 20 mmol/L    POC Glucose 254 (H) 74 - 99 mg/dL    POC Creatinine 0.70 0.6 - 1.3 mg/dL    eGFR, POC >90 >60 ml/min/1.73m2    UA Comment Comment Not Indicated.         Medications:  Medications Administered         denosumab (XGEVA) SC injection 120 mg Admin Date  2024 Action  Given Dose  120 mg Route  SubCUTAneous Documented By  Brianda Alegre RN        leuprolide (LUPRON) injection 22.5 mg Admin Date  2024 Action  Given Dose  22.5 mg Route  IntraMUSCular Documented By  Brianda Alegre RN          Lupron given in the RGM.     Mr. Reno tolerated treatment well and was [facility] in [facility] condition.   Patient is aware of future appointments.    Future Appointments   Date Time Provider Department Center   10/18/2024  9:45 AM Darvin Marion MD [facility] AMB   10/25/2024 10:30 AM SS FASTTRACK 3 [facility]   2024  2:30 PM SS FASTTRACK 2 [facility]       Brianda Alegre RN  2024

## 2024-10-04 ENCOUNTER — TELEPHONE (OUTPATIENT)
Age: 59
End: 2024-10-04

## 2024-10-04 NOTE — TELEPHONE ENCOUNTER
Dixie with [facility] called to get clarification on the order form. She would like to have it sent out today if possible    # [phone]

## 2024-10-04 NOTE — TELEPHONE ENCOUNTER
10/04/24 @2:18PM Delia Colin [facility] return this office call. Patient ID verified x2, informed her that this user and Sabrina RN has talked to Dr. Marion and he just wanted the CardioKinetix 360 CDx liquid profiling and that Dr. Marion did not want the tissue next testing for him . She then stated that she thinks it best that we order the guardant 360 test and not the Cdx because Cdx does not cover prostate cancer. She then explained that she just wants to educate  because they have updated the testing. She also stated that she just wants to make sure that he will still like to proceed with this same testing.  Informed her that this message will be pushed to him.

## 2024-10-04 NOTE — TELEPHONE ENCOUNTER
[facility] [place] at [facility]  [phone]    10/04/24 12:44 PM EDT - Attempted to reach Dixie.  There was no answer.  Left a voicemail for her to call back at her earliest convenience along with the phone number to our office.

## 2024-10-10 ENCOUNTER — SCHEDULED TELEPHONE ENCOUNTER (OUTPATIENT)
Age: 59
End: 2024-10-10

## 2024-10-10 ENCOUNTER — TRANSCRIBE ORDERS (OUTPATIENT)
Facility: HOSPITAL | Age: 59
End: 2024-10-10

## 2024-10-10 DIAGNOSIS — Z80.3 FAMILY HISTORY OF MALIGNANT NEOPLASM OF BREAST: ICD-10-CM

## 2024-10-10 DIAGNOSIS — C79.51 SECONDARY CARCINOMA OF BONE (HCC): Primary | ICD-10-CM

## 2024-10-10 DIAGNOSIS — C79.51 SECONDARY MALIGNANT NEOPLASM OF BONE AND BONE MARROW (HCC): ICD-10-CM

## 2024-10-10 DIAGNOSIS — C61 PROSTATE CANCER (HCC): ICD-10-CM

## 2024-10-10 DIAGNOSIS — Z80.42 FAMILY HISTORY OF MALIGNANT NEOPLASM OF PROSTATE: ICD-10-CM

## 2024-10-10 DIAGNOSIS — Z13.71 ENCNTR FOR NONPROCREAT SCREEN FOR GENETIC DIS CARRIER STATUS: ICD-10-CM

## 2024-10-10 DIAGNOSIS — C61 PROSTATE CANCER (HCC): Primary | ICD-10-CM

## 2024-10-10 DIAGNOSIS — C79.52 SECONDARY MALIGNANT NEOPLASM OF BONE AND BONE MARROW (HCC): ICD-10-CM

## 2024-10-10 DIAGNOSIS — Z80.0 FH: COLON CANCER: ICD-10-CM

## 2024-10-10 DIAGNOSIS — Z80.8 FAMILY HISTORY OF MELANOMA: ICD-10-CM

## 2024-10-10 NOTE — PROGRESS NOTES
situations where someone has a strong family history of cancer, doctors might recommend earlier or more frequent and/or specialized cancer screenings for you or your relatives.  For this reason, we encourage you and your close relatives to share information about family history with your doctors to help guide cancer screening decisions.    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   Pathogenic variants in this gene are known to cause polymerase proofreading-associated polyposis (PPAP), which is inherited in an autosomal dominant fashion (Floyd WEEKS et al. Dis. Colon Rectum 2014 Mar;57(3):396-7).   Pathogenic variants in POLD1 confer a significantly increased risk (cumulative lifetime risk of 20% or greater) for polyposis and/or colorectal cancer (Gasper VELÁSQUEZ et al. Jesusita. Radha. 2013 Feb;45(2):136-44; Nikki DIXON et al. Hum. Mol. Radha. 2014 Jul;23(13):3506-12; Clifford APONTE et al. Radha Med. 2018 Aug;20(8):890-895; Gasper VELÁSQUEZ et al. Fam Cancer. 2022 Apr;21(2):197-209).   Pathogenic POLD1 variants have also been detected in individuals with increased susceptibility to endometrial cancer; current evidence is insufficient to support a clear increase in risk of additional tumors over that of the general population.   The penetrance of POLD1 pathogenic variants associated with PPAP is incomplete, and variable expressivity is observed; therefore, cancer risks and phenotype will differ based on individual and family history.     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   The lysine at codon 486 is replaced by glutamic acid, an amino acid with similar properties. This amino acid position is highly conserved in available vertebrate species.   In addition, the in silico

## 2024-10-16 ENCOUNTER — TELEPHONE (OUTPATIENT)
Age: 59
End: 2024-10-16

## 2024-10-16 NOTE — TELEPHONE ENCOUNTER
Pt called and stated he has some question about his shot that's scheduled next Friday. Also the pt said his dentist would like to speak with Dr. Marion about some dental work the pt is needing to have. Please advise     # [phone]  
 at Dr. Chicas's office.  Inquired when a good time would be for the providers to discuss this patient's case.  The  provided Dr. Chicas's cell phone number and advised Dr. Marion could call him now.  Thanked her for her time.  Provided Dr. Chicas's phone number to Dr. Marion to call and discuss this patient's case.          [facility]  [phone]    10/17/24 12:22 PM EDT - Called and spoke with Karrie at Dr. Chicas's office.  Advised Dr. Chicas and Dr. Marion discussed this patient's case and Dr. Marion would like a copy of his notes.  Provided our fax number.  Karrie voiced understanding and gratitude for the call.  No further questions or concerns.            [facility]  [phone]    10/17/24 12:25 PM EDT - Called and spoke with the patient.  Patient's ID verified x 2.  Advised Dr. Marion and Dr. Chicas were able to connect and discuss his case.  Advised the plan on our end is still the same.  He will not be receiving Xgeva on 10/25 but will still come for labs and to see Dr. Marion.   Advised, per NP, he can continue taking the Zytiga and Prednisone as prescribed.  The patient voiced understanding and gratitude for the call.  No further questions or concerns.

## 2024-10-25 ENCOUNTER — HOSPITAL ENCOUNTER (OUTPATIENT)
Facility: HOSPITAL | Age: 59
Setting detail: INFUSION SERIES
Discharge: HOME | End: 2024-10-25
Payer: COMMERCIAL

## 2024-10-25 ENCOUNTER — HOSPITAL ENCOUNTER (OUTPATIENT)
Facility: HOSPITAL | Age: 59
Discharge: HOME | End: 2024-10-25
Attending: STUDENT IN AN ORGANIZED HEALTH CARE EDUCATION/TRAINING PROGRAM | Admitting: STUDENT IN AN ORGANIZED HEALTH CARE EDUCATION/TRAINING PROGRAM
Payer: COMMERCIAL

## 2024-10-25 ENCOUNTER — TELEPHONE (OUTPATIENT)
Age: 59
End: 2024-10-25

## 2024-10-25 ENCOUNTER — OFFICE VISIT (OUTPATIENT)
Age: 59
End: 2024-10-25
Payer: COMMERCIAL

## 2024-10-25 ENCOUNTER — APPOINTMENT (OUTPATIENT)
Facility: HOSPITAL | Age: 59
End: 2024-10-25
Payer: COMMERCIAL

## 2024-10-25 VITALS
TEMPERATURE: 98 F | OXYGEN SATURATION: 95 % | WEIGHT: 236 LBS | SYSTOLIC BLOOD PRESSURE: 130 MMHG | HEIGHT: 68 IN | RESPIRATION RATE: 18 BRPM | DIASTOLIC BLOOD PRESSURE: 83 MMHG | BODY MASS INDEX: 35.77 KG/M2 | HEART RATE: 74 BPM

## 2024-10-25 VITALS
OXYGEN SATURATION: 97 % | HEART RATE: 76 BPM | HEIGHT: 68 IN | TEMPERATURE: 97.2 F | RESPIRATION RATE: 17 BRPM | SYSTOLIC BLOOD PRESSURE: 135 MMHG | WEIGHT: 236 LBS | BODY MASS INDEX: 35.77 KG/M2 | DIASTOLIC BLOOD PRESSURE: 80 MMHG

## 2024-10-25 VITALS
DIASTOLIC BLOOD PRESSURE: 72 MMHG | TEMPERATURE: 97 F | SYSTOLIC BLOOD PRESSURE: 135 MMHG | OXYGEN SATURATION: 98 % | RESPIRATION RATE: 16 BRPM | WEIGHT: 234 LBS | BODY MASS INDEX: 35.58 KG/M2 | HEART RATE: 77 BPM

## 2024-10-25 DIAGNOSIS — C61 PROSTATE CANCER (HCC): ICD-10-CM

## 2024-10-25 DIAGNOSIS — C79.51 SECONDARY MALIGNANT NEOPLASM OF BONE AND BONE MARROW (HCC): ICD-10-CM

## 2024-10-25 DIAGNOSIS — C79.51 PROSTATE CANCER METASTATIC TO BONE (HCC): ICD-10-CM

## 2024-10-25 DIAGNOSIS — Z79.899 HIGH RISK MEDICATION USE: ICD-10-CM

## 2024-10-25 DIAGNOSIS — C79.52 SECONDARY MALIGNANT NEOPLASM OF BONE AND BONE MARROW (HCC): ICD-10-CM

## 2024-10-25 DIAGNOSIS — C79.51 SECONDARY CARCINOMA OF BONE (HCC): ICD-10-CM

## 2024-10-25 DIAGNOSIS — C61 PROSTATE CANCER METASTATIC TO BONE (HCC): ICD-10-CM

## 2024-10-25 DIAGNOSIS — M87.180 OSTEONECROSIS OF JAW DUE TO DRUG: Primary | ICD-10-CM

## 2024-10-25 LAB
ALBUMIN SERPL-MCNC: 3.3 G/DL (ref 3.5–5)
ALBUMIN/GLOB SERPL: 0.9 (ref 1.1–2.2)
ALP SERPL-CCNC: 67 U/L (ref 45–117)
ALT SERPL-CCNC: 21 U/L (ref 12–78)
ANION GAP SERPL CALC-SCNC: 1 MMOL/L (ref 2–12)
AST SERPL-CCNC: 9 U/L (ref 15–37)
BASOPHILS # BLD: 0.1 K/UL (ref 0–0.1)
BASOPHILS NFR BLD: 1 % (ref 0–1)
BILIRUB SERPL-MCNC: 0.5 MG/DL (ref 0.2–1)
BUN SERPL-MCNC: 17 MG/DL (ref 6–20)
BUN/CREAT SERPL: 21 (ref 12–20)
CALCIUM SERPL-MCNC: 9.1 MG/DL (ref 8.5–10.1)
CHLORIDE SERPL-SCNC: 109 MMOL/L (ref 97–108)
CO2 SERPL-SCNC: 29 MMOL/L (ref 21–32)
CREAT SERPL-MCNC: 0.8 MG/DL (ref 0.7–1.3)
DIFFERENTIAL METHOD BLD: ABNORMAL
ECHO BSA: 2.27 M2
EOSINOPHIL # BLD: 0.2 K/UL (ref 0–0.4)
EOSINOPHIL NFR BLD: 1 % (ref 0–7)
ERYTHROCYTE [DISTWIDTH] IN BLOOD BY AUTOMATED COUNT: 12.9 % (ref 11.5–14.5)
GLOBULIN SER CALC-MCNC: 3.7 G/DL (ref 2–4)
GLUCOSE BLD STRIP.AUTO-MCNC: 148 MG/DL (ref 65–117)
GLUCOSE SERPL-MCNC: 185 MG/DL (ref 65–100)
HCT VFR BLD AUTO: 36.3 % (ref 36.6–50.3)
HGB BLD-MCNC: 12.6 G/DL (ref 12.1–17)
IMM GRANULOCYTES # BLD AUTO: 0.1 K/UL (ref 0–0.04)
IMM GRANULOCYTES NFR BLD AUTO: 1 % (ref 0–0.5)
LYMPHOCYTES # BLD: 4.2 K/UL (ref 0.8–3.5)
LYMPHOCYTES NFR BLD: 34 % (ref 12–49)
MCH RBC QN AUTO: 33.9 PG (ref 26–34)
MCHC RBC AUTO-ENTMCNC: 34.7 G/DL (ref 30–36.5)
MCV RBC AUTO: 97.6 FL (ref 80–99)
MONOCYTES # BLD: 0.8 K/UL (ref 0–1)
MONOCYTES NFR BLD: 6 % (ref 5–13)
NEUTS SEG # BLD: 7.3 K/UL (ref 1.8–8)
NEUTS SEG NFR BLD: 57 % (ref 32–75)
NRBC # BLD: 0 K/UL (ref 0–0.01)
NRBC BLD-RTO: 0 PER 100 WBC
PLATELET # BLD AUTO: 259 K/UL (ref 150–400)
PMV BLD AUTO: 9.2 FL (ref 8.9–12.9)
POTASSIUM SERPL-SCNC: 4.2 MMOL/L (ref 3.5–5.1)
PROT SERPL-MCNC: 7 G/DL (ref 6.4–8.2)
PSA SERPL-MCNC: 1 NG/ML (ref 0.01–4)
RBC # BLD AUTO: 3.72 M/UL (ref 4.1–5.7)
SERVICE CMNT-IMP: ABNORMAL
SODIUM SERPL-SCNC: 139 MMOL/L (ref 136–145)
WBC # BLD AUTO: 12.7 K/UL (ref 4.1–11.1)

## 2024-10-25 PROCEDURE — 88342 IMHCHEM/IMCYTCHM 1ST ANTB: CPT

## 2024-10-25 PROCEDURE — 2500000003 HC RX 250 WO HCPCS: Performed by: STUDENT IN AN ORGANIZED HEALTH CARE EDUCATION/TRAINING PROGRAM

## 2024-10-25 PROCEDURE — 84153 ASSAY OF PSA TOTAL: CPT

## 2024-10-25 PROCEDURE — 80053 COMPREHEN METABOLIC PANEL: CPT

## 2024-10-25 PROCEDURE — C1894 INTRO/SHEATH, NON-LASER: HCPCS

## 2024-10-25 PROCEDURE — 2720000010 HC SURG SUPPLY STERILE

## 2024-10-25 PROCEDURE — 88307 TISSUE EXAM BY PATHOLOGIST: CPT

## 2024-10-25 PROCEDURE — 2709999900 HC NON-CHARGEABLE SUPPLY

## 2024-10-25 PROCEDURE — 88311 DECALCIFY TISSUE: CPT

## 2024-10-25 PROCEDURE — 99214 OFFICE O/P EST MOD 30 MIN: CPT | Performed by: INTERNAL MEDICINE

## 2024-10-25 PROCEDURE — 99152 MOD SED SAME PHYS/QHP 5/>YRS: CPT

## 2024-10-25 PROCEDURE — 36415 COLL VENOUS BLD VENIPUNCTURE: CPT

## 2024-10-25 PROCEDURE — 6360000002 HC RX W HCPCS: Performed by: STUDENT IN AN ORGANIZED HEALTH CARE EDUCATION/TRAINING PROGRAM

## 2024-10-25 PROCEDURE — 77002 NEEDLE LOCALIZATION BY XRAY: CPT

## 2024-10-25 PROCEDURE — 82962 GLUCOSE BLOOD TEST: CPT

## 2024-10-25 PROCEDURE — 85025 COMPLETE CBC W/AUTO DIFF WBC: CPT

## 2024-10-25 RX ORDER — LIDOCAINE HYDROCHLORIDE 10 MG/ML
INJECTION, SOLUTION EPIDURAL; INFILTRATION; INTRACAUDAL; PERINEURAL PRN
Status: COMPLETED | OUTPATIENT
Start: 2024-10-25 | End: 2024-10-25

## 2024-10-25 RX ORDER — FENTANYL CITRATE 50 UG/ML
INJECTION, SOLUTION INTRAMUSCULAR; INTRAVENOUS PRN
Status: COMPLETED | OUTPATIENT
Start: 2024-10-25 | End: 2024-10-25

## 2024-10-25 RX ORDER — MIDAZOLAM HYDROCHLORIDE 1 MG/ML
INJECTION, SOLUTION INTRAMUSCULAR; INTRAVENOUS PRN
Status: COMPLETED | OUTPATIENT
Start: 2024-10-25 | End: 2024-10-25

## 2024-10-25 RX ADMIN — MIDAZOLAM HYDROCHLORIDE 1 MG: 1 INJECTION, SOLUTION INTRAMUSCULAR; INTRAVENOUS at 08:57

## 2024-10-25 RX ADMIN — MIDAZOLAM HYDROCHLORIDE 1 MG: 1 INJECTION, SOLUTION INTRAMUSCULAR; INTRAVENOUS at 09:00

## 2024-10-25 RX ADMIN — LIDOCAINE HYDROCHLORIDE 5 ML: 10 INJECTION, SOLUTION EPIDURAL; INFILTRATION; INTRACAUDAL; PERINEURAL at 09:00

## 2024-10-25 RX ADMIN — FENTANYL CITRATE 25 MCG: 50 INJECTION, SOLUTION INTRAMUSCULAR; INTRAVENOUS at 09:03

## 2024-10-25 RX ADMIN — MIDAZOLAM HYDROCHLORIDE 1 MG: 1 INJECTION, SOLUTION INTRAMUSCULAR; INTRAVENOUS at 09:05

## 2024-10-25 RX ADMIN — LIDOCAINE HYDROCHLORIDE 5 ML: 10 INJECTION, SOLUTION EPIDURAL; INFILTRATION; INTRACAUDAL; PERINEURAL at 09:01

## 2024-10-25 RX ADMIN — MIDAZOLAM HYDROCHLORIDE 1 MG: 1 INJECTION, SOLUTION INTRAMUSCULAR; INTRAVENOUS at 09:06

## 2024-10-25 RX ADMIN — FENTANYL CITRATE 50 MCG: 50 INJECTION, SOLUTION INTRAMUSCULAR; INTRAVENOUS at 08:57

## 2024-10-25 RX ADMIN — FENTANYL CITRATE 25 MCG: 50 INJECTION, SOLUTION INTRAMUSCULAR; INTRAVENOUS at 09:00

## 2024-10-25 NOTE — PROGRESS NOTES
INTERVENTIONAL RADIOLOGY  Preoperative History and Physical      Patient:  Kenroy Reno  :  1965  Age:  59 y.o.  MRN:  [id number]  Today's Date:  10/25/2024      CC / HPI   Kenroy Reno is a 59 y.o. male with a history of T9 bone lesion who presents for bone biopsy.    PAST MEDICAL HISTORY  Past Medical History:   Diagnosis Date    Anterior myocardial infarction (HCC) 2002       PAST SURGICAL HISTORY  Past Surgical History:   Procedure Laterality Date    PROSTATECTOMY  2011       SOCIAL HISTORY  Social History     Socioeconomic History    Marital status:      Spouse name: Not on file    Number of children: Not on file    Years of education: Not on file    Highest education level: Not on file   Occupational History    Not on file   Tobacco Use    Smoking status: Every Day     Types: Cigarettes    Smokeless tobacco: Never   Vaping Use    Vaping status: Never Used   Substance and Sexual Activity    Alcohol use: Not Currently    Drug use: Not Currently    Sexual activity: Not on file   Other Topics Concern    Not on file   Social History Narrative    Not on file     Social Determinants of Health     Financial Resource Strain: Not on file   Food Insecurity: Not on file   Transportation Needs: Not on file   Physical Activity: Not on file   Stress: Not on file   Social Connections: Not on file   Intimate Partner Violence: Not on file   Housing Stability: Not on file       FAMILY HISTORY  No family history on file.    CURRENT MEDICATIONS  No current facility-administered medications for this encounter.       ALLERGIES  No Known Allergies    DIAGNOSTIC STUDIES   IMAGING STUDIES  Relevant Imaging studies reviewed    LABS  Lab Results   Component Value Date/Time    WBC 12.6 2024 04:00 PM    HGB 15.3 2024 04:00 PM    HCT 43.7 2024 04:00 PM     2024 04:00 PM    MCV 94 2024 04:00 PM     Lab Results   Component Value Date/Time     2024 04:05 PM    K 4.5  08/22/2024 04:05 PM     08/22/2024 04:05 PM    CO2 25 08/22/2024 04:05 PM    BUN 15 08/22/2024 04:05 PM     No results found for: \"INR\", \"PT1\"      PHYSICAL EXAM   /76   Pulse 74   Temp 97.2 °F (36.2 °C) (Temporal)   Resp 20   Ht 1.727 m (5' 8\")   Wt 107 kg (236 lb)   SpO2 96%   BMI 35.88 kg/m²    General:  NAD  Heart:  RRR  Lungs:  NWOB  Neurological:  AAOX3      ASSESSMENT / PLAN   Procedure to be performed:  moderate  Plan for sedation:  moderate  Mallampati Airway Assessment:  II (soft palate, uvula, fauces visible)  ASA Classification:  ASA 2 - Patient with mild systemic disease with no functional limitations  Post procedure plan:  observation per protocol  Informed consent:  indication, risks and alternatives of the planned procedure and sedation methods reviewed with the patient / family who agree to proceed  Code status:  Full    Procedure to be performed by Dr. Zamora.     Case reviewed with Dr. Zamora who is in agreement with the assessment and plan.      ALBERTO Alicea - NP  [facility]

## 2024-10-25 NOTE — PROGRESS NOTES
TRANSFER - OUT REPORT:    Verbal report given to SEAN(name) on Kenroy Reno being transferred to [facility] for routine post-op       Report consisted of patient's Situation, Background, Assessment and   Recommendations(SBAR).     Information from the following report(s) Nurse Handoff Report was reviewed with the receiving nurse.    Opportunity for questions and clarification was provided.      Patient transported with:   Registered Nurse

## 2024-10-25 NOTE — PROGRESS NOTES
Patient arrived. ID and allergies verified verbally with patient. Pt voices understanding of procedure to be performed. Consent obtained. Pt prepped for procedure. Pt denies contrast allergy. Patient denies taking any blood thinners.    Port  Stent      8:35 am  TRANSFER - OUT REPORT:    Verbal report given to Lena Reno  being transferred to IR for ordered procedure       Report consisted of patient's Situation, Background, Assessment and   Recommendations(SBAR).     Information from the following report(s) Nurse Handoff Report was reviewed with the receiving nurse.           Lines:   Implantable Port Left Subclavian (Active)        Opportunity for questions and clarification was provided.      Patient transported with:  Registered Nurse      9:20 am  TRANSFER - IN REPORT:    Verbal report received from Eula Reno  being received from IR for routine post-op      Report consisted of patient's Situation, Background, Assessment and   Recommendations(SBAR).     Information from the following report(s) Nurse Handoff Report was reviewed with the receiving nurse.    Opportunity for questions and clarification was provided.      Assessment completed upon patient's arrival to unit and care assumed.       9:40 am  Discharge instructions and prescriptions reviewed with  Patient and wife. Opportunity provided for questions. Patient and wife verbalized understanding. Signed copy of discharge placed in the front of patient's chart.     Patient ambulated tolerated well.  IV and tele removed.     10:30 am  Patient escorted via wheelchair to entrance.  Wife driving. Patient discharged into care of wife.

## 2024-10-25 NOTE — TELEPHONE ENCOUNTER
Called pt to ask if he could come to his opic apt at 9:30 and see Dr. Marion at 10:00 due to a scheduling conflict per teams request.   Pt did not answer so I left the pt a voice mail.

## 2024-10-25 NOTE — DISCHARGE INSTRUCTIONS
Bone Biopsy: What to Expect at Home  Your Recovery  A bone biopsy is a procedure in which a small sample of bone is taken from the body and looked at under a microscope for cancer, infection, or other bone disorders. There are two types of bone biopsies: closed (or needle) biopsy and open biopsy. In either case, be sure to have someone drive you home afterwards.  If you had a closed or needle biopsy, a needle was inserted through the skin and into the bone. You may go home shortly after the procedure. If you got a sedative, you may need to stay longer. The biopsy site may be sore and tender for up to a week.  If you had an open biopsy, a cut (incision) was made through the skin to expose an area of the bone. You may need to stay in the hospital overnight. The biopsy site may be sore and tender for up to a week.  This care sheet gives you a general idea about how long it will take for you to recover. But each person recovers at a different pace. Follow the steps below to get better as quickly as possible.  How can you care for yourself at home?  Activity    Rest when you feel tired.     You can do your normal activities when it feels okay to do so.     Your doctor may have you avoid certain activities for a while based on where the biopsy was done and whether it was open or closed.   Diet    You can eat your normal diet. If your stomach is upset, try bland, low-fat foods like plain rice, broiled chicken, toast, and yogurt.   Medicines    Be safe with medicines. Read and follow all instructions on the label.  If the doctor gave you a prescription medicine for pain, take it as prescribed.  If you are not taking a prescription pain medicine, ask your doctor if you can take an over-the-counter medicine.     If you stopped taking aspirin or some other blood thinner, your doctor will tell you when to start taking it again.     Your doctor will tell you if and when you can restart your medicines. The doctor will also

## 2024-10-25 NOTE — TELEPHONE ENCOUNTER
LM with patients wife that Dr. Marion would like to reschedule his appointments to Monday 10/28/24 at 9 and 9:30am. Gave her office number to call back.

## 2024-10-27 NOTE — PROGRESS NOTES
[facility] [place] at [facility]  [address], [address] [place] [address]  W: [phone]  F: [phone] Patient ID  Name: Kenroy Reno  YOB: 1965  MRN: [id number]  Referring Provider:   No referring provider defined for this encounter.  Primary Care Provider:   Jose Shanks MD       HEMATOLOGY/MEDICAL ONCOLOGY  NOTE     Reason for Evaluation:     No chief complaint on file.    Oncology History:   Please review original records for clinical decision making. This summary highlights focused aspects of patient's ongoing care and may have a recurring section in notes with either updates or remain unchanged as a longitudinal care summary.  ______________________________________________________  DIAGNOSIS: METASTATIC/RECURRENT PROSTATE CANCER  <>PATHOLOGY<>  [id number] PROSTATE BIOPSY;  [facility]  2/3/2011  ADENOCARCINOMA (PIA RIGHT MID LATERAL, RIGHT BASE MEDIAL, RIGHT BASE LATERAL:  (PIA 4+5=9)     <>STAGING<> RECURRENT DISEASE, Bone Disease, lymph nodes mediastinal,bilateral hilar. Extensive bone disease in thoracic, Lumbar, appendicular skeleton..   Cancer Staging   No matching staging information was found for the patient.       <>GOALS OF CARE<>PALLIATIVE INTENT  <>CURRENT TREATMENT<>Abiraterone 1000mg ,Prednisone 5mg every 12h  <>PRIOR TREATMENT> Per outside Records ([facility] note from 4/22/20224 (Dr. Price): Prostatectomy 3/2011 (Dr. Sosa). Rise in PSA 7/2014, ADT 2014.    Abitraterone/Prednisone 1/2023--? Completed 6 cycles of taxotere from 1/2023. Started Xgeva 7/2023.  <>BIOMARKERS<>  -- PSA 0.6 from 4/15/24  0.5 9/7/23,  1.5 on 4/24/23, PSA 3.0 from 1/30/23. PSA down to 1.7 on 3/13/23.  PSMA PET on 7/23 reportedly showed treatment response.    Subjective:     History of Present Illness:   DATE OF VISIT: 10/25/24   Kenroy Reno is a 59 y.o. male who presents for a follow-up evaluation for prostate cancer to the bone.             Patient overall

## 2024-10-29 RX ORDER — PREDNISONE 5 MG/1
5 TABLET ORAL EVERY 12 HOURS
Qty: 180 TABLET | Refills: 3 | Status: SHIPPED | OUTPATIENT
Start: 2024-10-29

## 2024-11-07 ENCOUNTER — TELEPHONE (OUTPATIENT)
Age: 59
End: 2024-11-07

## 2024-11-07 NOTE — TELEPHONE ENCOUNTER
Pt called in to reschedule his opic on 11/22.  Pt would like to move the appt to 11/15, earlier morning if possible.     CB# [phone]

## 2024-11-07 NOTE — TELEPHONE ENCOUNTER
Telephone call from patient. 2 IDs used.  He explains that his Guardant 360 results are back and he would like help interpreting the test report.  He has placed a call to Dr. Marion as well and is awaiting a reply.  I shared that Dr. Marion is the best person to interpret and discuss his Guardant testing.  I reviewed the front page of the report with the patient today, including information about MSI, Tumor fraction, and biomarker identification \"No reportable somatic alterations with associated therapies were detected in this patient's sample.\"  I encouraged the patient to speak with Dr. Marion about the report at his next visit.  Patient explains that he may need to adjust this appointment.   He shared that he had some dental work recently and was found to have receeding gums exposing an area of his jaw bone. He has questions about his next Xgeva treatment. I recommended calling Dr. Marion's office and sharing this information with them. We agreed that I will also forward my note to Dr. Marion's staff.  He expressed appreciation of my assistance today.  The patient was given time to ask questions. All questions answered. The patient was previously provided with our contact information. The patient was instructed to reach out with additional questions.The patient communicated an understanding of and agreement with the plan.

## 2024-11-08 ENCOUNTER — TELEPHONE (OUTPATIENT)
Age: 59
End: 2024-11-08

## 2024-11-08 NOTE — TELEPHONE ENCOUNTER
Spoke with patients wife to schedule the patients labs for 11/15/24 and to cancel the appointment with Dr. Marion on 11/22/24 due to her having surgery, she had no further questions.

## 2024-11-13 DIAGNOSIS — C61 PROSTATE CANCER METASTATIC TO BONE (HCC): Primary | ICD-10-CM

## 2024-11-13 DIAGNOSIS — C79.51 PROSTATE CANCER METASTATIC TO BONE (HCC): Primary | ICD-10-CM

## 2024-11-15 ENCOUNTER — APPOINTMENT (OUTPATIENT)
Facility: HOSPITAL | Age: 59
End: 2024-11-15
Payer: COMMERCIAL

## 2024-11-15 ENCOUNTER — HOSPITAL ENCOUNTER (OUTPATIENT)
Facility: HOSPITAL | Age: 59
Setting detail: INFUSION SERIES
Discharge: HOME | End: 2024-11-15
Payer: COMMERCIAL

## 2024-11-15 VITALS — SYSTOLIC BLOOD PRESSURE: 125 MMHG | RESPIRATION RATE: 18 BRPM | HEART RATE: 107 BPM | DIASTOLIC BLOOD PRESSURE: 77 MMHG

## 2024-11-15 DIAGNOSIS — C79.51 PROSTATE CANCER METASTATIC TO BONE (HCC): ICD-10-CM

## 2024-11-15 DIAGNOSIS — C61 PROSTATE CANCER METASTATIC TO BONE (HCC): ICD-10-CM

## 2024-11-15 LAB
ALBUMIN SERPL-MCNC: 3.6 G/DL (ref 3.5–5)
ALBUMIN/GLOB SERPL: 1 (ref 1.1–2.2)
ALP SERPL-CCNC: 71 U/L (ref 45–117)
ALT SERPL-CCNC: 23 U/L (ref 12–78)
ANION GAP SERPL CALC-SCNC: 7 MMOL/L (ref 2–12)
AST SERPL-CCNC: 17 U/L (ref 15–37)
BASOPHILS # BLD: 0.1 K/UL (ref 0–0.1)
BASOPHILS NFR BLD: 1 % (ref 0–1)
BILIRUB SERPL-MCNC: 0.5 MG/DL (ref 0.2–1)
BUN SERPL-MCNC: 15 MG/DL (ref 6–20)
BUN/CREAT SERPL: 20 (ref 12–20)
CALCIUM SERPL-MCNC: 9.3 MG/DL (ref 8.5–10.1)
CHLORIDE SERPL-SCNC: 106 MMOL/L (ref 97–108)
CO2 SERPL-SCNC: 24 MMOL/L (ref 21–32)
CREAT SERPL-MCNC: 0.76 MG/DL (ref 0.7–1.3)
DIFFERENTIAL METHOD BLD: ABNORMAL
EOSINOPHIL # BLD: 0.1 K/UL (ref 0–0.4)
EOSINOPHIL NFR BLD: 1 % (ref 0–7)
ERYTHROCYTE [DISTWIDTH] IN BLOOD BY AUTOMATED COUNT: 12.2 % (ref 11.5–14.5)
GLOBULIN SER CALC-MCNC: 3.6 G/DL (ref 2–4)
GLUCOSE SERPL-MCNC: 146 MG/DL (ref 65–100)
HCT VFR BLD AUTO: 37.1 % (ref 36.6–50.3)
HGB BLD-MCNC: 13.2 G/DL (ref 12.1–17)
IMM GRANULOCYTES # BLD AUTO: 0.1 K/UL (ref 0–0.04)
IMM GRANULOCYTES NFR BLD AUTO: 1 % (ref 0–0.5)
LYMPHOCYTES # BLD: 3.6 K/UL (ref 0.8–3.5)
LYMPHOCYTES NFR BLD: 33 % (ref 12–49)
MCH RBC QN AUTO: 33.8 PG (ref 26–34)
MCHC RBC AUTO-ENTMCNC: 35.6 G/DL (ref 30–36.5)
MCV RBC AUTO: 94.9 FL (ref 80–99)
MONOCYTES # BLD: 0.8 K/UL (ref 0–1)
MONOCYTES NFR BLD: 8 % (ref 5–13)
NEUTS SEG # BLD: 6.3 K/UL (ref 1.8–8)
NEUTS SEG NFR BLD: 56 % (ref 32–75)
NRBC # BLD: 0 K/UL (ref 0–0.01)
NRBC BLD-RTO: 0 PER 100 WBC
PLATELET # BLD AUTO: 285 K/UL (ref 150–400)
PMV BLD AUTO: 8.9 FL (ref 8.9–12.9)
POTASSIUM SERPL-SCNC: 4.2 MMOL/L (ref 3.5–5.1)
PROT SERPL-MCNC: 7.2 G/DL (ref 6.4–8.2)
PSA SERPL-MCNC: 1.1 NG/ML (ref 0.01–4)
RBC # BLD AUTO: 3.91 M/UL (ref 4.1–5.7)
SODIUM SERPL-SCNC: 137 MMOL/L (ref 136–145)
WBC # BLD AUTO: 11 K/UL (ref 4.1–11.1)

## 2024-11-15 PROCEDURE — 80053 COMPREHEN METABOLIC PANEL: CPT

## 2024-11-15 PROCEDURE — 84403 ASSAY OF TOTAL TESTOSTERONE: CPT

## 2024-11-15 PROCEDURE — 85025 COMPLETE CBC W/AUTO DIFF WBC: CPT

## 2024-11-15 PROCEDURE — 36415 COLL VENOUS BLD VENIPUNCTURE: CPT

## 2024-11-15 PROCEDURE — 84153 ASSAY OF PSA TOTAL: CPT

## 2024-11-15 ASSESSMENT — PAIN SCALES - GENERAL: PAINLEVEL_OUTOF10: 10

## 2024-11-15 ASSESSMENT — PAIN DESCRIPTION - ORIENTATION: ORIENTATION: LEFT

## 2024-11-15 ASSESSMENT — PAIN DESCRIPTION - LOCATION: LOCATION: JAW

## 2024-11-15 NOTE — PROGRESS NOTES
[facility] Progress Note    Date: November 15, 2024        0800: Pt arrived ambulatory to [facility] for Lab Work in stable condition.  Assessment completed. Labs drawn peripherally from left AC and sent for processing. Port still in place, due to be flushed at next monthly visit.      Patient Vitals for the past 12 hrs:   Pulse Resp BP   11/15/24 0807 (!) 107 18 125/77       Tolerated treatment well, no adverse reactions noted. D/Cd from [facility] ambulatory and in no distress.        Vonnie Gonzalez RN  November 15, 2024

## 2024-11-16 LAB — TESTOST SERPL-MCNC: <3 NG/DL (ref 264–916)

## 2024-11-22 ENCOUNTER — HOSPITAL ENCOUNTER (OUTPATIENT)
Facility: HOSPITAL | Age: 59
Setting detail: INFUSION SERIES
End: 2024-11-22
Payer: COMMERCIAL

## 2024-12-16 ENCOUNTER — TELEPHONE (OUTPATIENT)
Age: 59
End: 2024-12-16

## 2024-12-16 NOTE — TELEPHONE ENCOUNTER
Can you please confirm treatment plan, as xgeva is currently on hold. Can I add labs to pts next appt?

## 2024-12-17 DIAGNOSIS — C61 PROSTATE CANCER (HCC): Primary | ICD-10-CM

## 2024-12-17 DIAGNOSIS — C61 PROSTATE CANCER METASTATIC TO BONE (HCC): ICD-10-CM

## 2024-12-17 DIAGNOSIS — C79.51 PROSTATE CANCER METASTATIC TO BONE (HCC): ICD-10-CM

## 2024-12-17 DIAGNOSIS — C61 PROSTATE CANCER (HCC): ICD-10-CM

## 2024-12-17 NOTE — PROGRESS NOTES
12/17/24 at 10:59 am - Per the patient's request and Dr. Marion, lab orders placed for CBC with diff, CMP and PSA diagnostic to be completed at [facility].

## 2024-12-18 DIAGNOSIS — C61 PROSTATE CANCER METASTATIC TO BONE (HCC): ICD-10-CM

## 2024-12-18 DIAGNOSIS — C79.51 PROSTATE CANCER METASTATIC TO BONE (HCC): ICD-10-CM

## 2024-12-18 RX ORDER — ABIRATERONE ACETATE 250 MG/1
1000 TABLET ORAL DAILY
Qty: 120 TABLET | Refills: 5 | Status: ACTIVE | OUTPATIENT
Start: 2024-12-18

## 2024-12-19 LAB
ALBUMIN SERPL-MCNC: 4.3 G/DL (ref 3.9–4.9)
ALP SERPL-CCNC: 72 IU/L (ref 44–121)
ALT SERPL-CCNC: 21 IU/L (ref 0–44)
AST SERPL-CCNC: 15 IU/L (ref 0–40)
BASOPHILS # BLD AUTO: 0.1 X10E3/UL (ref 0–0.2)
BASOPHILS NFR BLD AUTO: 1 %
BILIRUB SERPL-MCNC: 0.4 MG/DL (ref 0–1.2)
BUN SERPL-MCNC: 19 MG/DL (ref 8–27)
BUN/CREAT SERPL: 21 (ref 10–24)
CALCIUM SERPL-MCNC: 10.8 MG/DL (ref 8.6–10.2)
CHLORIDE SERPL-SCNC: 103 MMOL/L (ref 96–106)
CO2 SERPL-SCNC: 23 MMOL/L (ref 20–29)
CREAT SERPL-MCNC: 0.92 MG/DL (ref 0.76–1.27)
EGFRCR SERPLBLD CKD-EPI 2021: 95 ML/MIN/1.73
EOSINOPHIL # BLD AUTO: 0.2 X10E3/UL (ref 0–0.4)
EOSINOPHIL NFR BLD AUTO: 1 %
ERYTHROCYTE [DISTWIDTH] IN BLOOD BY AUTOMATED COUNT: 12.4 % (ref 11.6–15.4)
GLOBULIN SER CALC-MCNC: 2.8 G/DL (ref 1.5–4.5)
GLUCOSE SERPL-MCNC: 109 MG/DL (ref 70–99)
HCT VFR BLD AUTO: 39.3 % (ref 37.5–51)
HGB BLD-MCNC: 13.4 G/DL (ref 13–17.7)
IMM GRANULOCYTES # BLD AUTO: 0.1 X10E3/UL (ref 0–0.1)
IMM GRANULOCYTES NFR BLD AUTO: 1 %
LYMPHOCYTES # BLD AUTO: 3.3 X10E3/UL (ref 0.7–3.1)
LYMPHOCYTES NFR BLD AUTO: 25 %
MCH RBC QN AUTO: 33.9 PG (ref 26.6–33)
MCHC RBC AUTO-ENTMCNC: 34.1 G/DL (ref 31.5–35.7)
MCV RBC AUTO: 100 FL (ref 79–97)
MONOCYTES # BLD AUTO: 0.9 X10E3/UL (ref 0.1–0.9)
MONOCYTES NFR BLD AUTO: 7 %
NEUTROPHILS # BLD AUTO: 8.7 X10E3/UL (ref 1.4–7)
NEUTROPHILS NFR BLD AUTO: 65 %
PLATELET # BLD AUTO: 306 X10E3/UL (ref 150–450)
POTASSIUM SERPL-SCNC: 5.5 MMOL/L (ref 3.5–5.2)
PROT SERPL-MCNC: 7.1 G/DL (ref 6–8.5)
PSA SERPL-MCNC: 1.2 NG/ML (ref 0–4)
RBC # BLD AUTO: 3.95 X10E6/UL (ref 4.14–5.8)
SODIUM SERPL-SCNC: 140 MMOL/L (ref 134–144)
WBC # BLD AUTO: 13.3 X10E3/UL (ref 3.4–10.8)

## 2024-12-23 ENCOUNTER — OFFICE VISIT (OUTPATIENT)
Age: 59
End: 2024-12-23
Payer: COMMERCIAL

## 2024-12-23 VITALS
OXYGEN SATURATION: 98 % | HEART RATE: 70 BPM | SYSTOLIC BLOOD PRESSURE: 132 MMHG | BODY MASS INDEX: 35.28 KG/M2 | DIASTOLIC BLOOD PRESSURE: 78 MMHG | WEIGHT: 232 LBS | TEMPERATURE: 98 F

## 2024-12-23 DIAGNOSIS — M87.180 OSTEONECROSIS OF JAW DUE TO DRUG: ICD-10-CM

## 2024-12-23 DIAGNOSIS — C79.51 PROSTATE CANCER METASTATIC TO BONE (HCC): Primary | ICD-10-CM

## 2024-12-23 DIAGNOSIS — C61 PROSTATE CANCER METASTATIC TO BONE (HCC): Primary | ICD-10-CM

## 2024-12-23 DIAGNOSIS — Z79.899 HIGH RISK MEDICATION USE: ICD-10-CM

## 2024-12-23 DIAGNOSIS — D72.820 LYMPHOCYTOSIS: ICD-10-CM

## 2024-12-23 PROCEDURE — 99214 OFFICE O/P EST MOD 30 MIN: CPT | Performed by: INTERNAL MEDICINE

## 2024-12-23 RX ORDER — ONDANSETRON 8 MG/1
8 TABLET, FILM COATED ORAL EVERY 8 HOURS PRN
Qty: 60 TABLET | Refills: 1 | Status: SHIPPED | OUTPATIENT
Start: 2024-12-23

## 2024-12-23 NOTE — PROGRESS NOTES
Cancer [place] at [facility]  [address], [address] [place] [address]  W: [phone]  F: [phone] Patient ID  Name: Kenroy Reno  YOB: 1965  MRN: [id number]  Referring Provider:   No referring provider defined for this encounter.  Primary Care Provider:   Jose Shanks MD       HEMATOLOGY/MEDICAL ONCOLOGY  NOTE     Reason for Evaluation:     Chief Complaint   Patient presents with    Follow-up     Subjective:     Oncology History:   Please review original records for clinical decision making. This summary highlights focused aspects of patient's ongoing care and may have a recurring section in notes with either updates or remain unchanged as a longitudinal care summary.  ______________________________________________________  DIAGNOSIS: METASTATIC/RECURRENT PROSTATE CANCER  <>PATHOLOGY<>  [id number] PROSTATE BIOPSY;  [facility]  2/3/2011  ADENOCARCINOMA (PIA RIGHT MID LATERAL, RIGHT BASE MEDIAL, RIGHT BASE LATERAL:  (PIA 4+5=9)     <>STAGING<> RECURRENT DISEASE, Bone Disease, lymph nodes mediastinal,bilateral hilar. Extensive bone disease in thoracic, Lumbar, appendicular skeleton.   Cancer Staging   No matching staging information was found for the patient.       <>GOALS OF CARE<>PALLIATIVE INTENT  <>CURRENT TREATMENT<>Abiraterone 1000mg ,Prednisone 5mg every 12h  <>PRIOR TREATMENT> Per outside Records ([facility] note from 4/22/20224 (Dr. Price): Prostatectomy 3/2011 (Dr. Sosa). Rise in PSA 7/2014, ADT 2014.    Abitraterone/Prednisone 1/2023--? Completed 6 cycles of taxotere from 1/2023. Started Xgeva 7/2023.  <>BIOMARKERS<>  -- PSA 0.6 from 4/15/24  0.5 9/7/23,  1.5 on 4/24/23, PSA 3.0 from 1/30/23. PSA down to 1.7 on 3/13/23.  PSMA PET on 7/23 reportedly showed treatment response.  History of Present Illness:   Date of Visit: 12/23/2024  Kenroy Reno is a 59 y.o. male who presents for a follow-up evaluation for METASTATIC PROSTATE CANCER.

## 2024-12-23 NOTE — PROGRESS NOTES
Chief Complaint   Patient presents with    Follow-up        /78 (Site: Left Upper Arm, Position: Sitting, Cuff Size: Large Adult)   Pulse 70   Temp 98 °F (36.7 °C) (Oral)   Wt 105.2 kg (232 lb)   SpO2 98%   BMI 35.28 kg/m²     Pain Scale: 0 - No pain/10  Pain Location:      Current Outpatient Medications on File Prior to Visit   Medication Sig Dispense Refill    abiraterone acetate (ZYTIGA) 250 MG tablet Take 4 tablets by mouth daily 120 tablet 5    predniSONE (DELTASONE) 5 MG tablet Take 1 tablet by mouth in the morning and 1 tablet in the evening. 180 tablet 3    sertraline (ZOLOFT) 25 MG tablet Take 1 tablet by mouth daily Titrating to this 90 tablet 1    metFORMIN (GLUCOPHAGE) 1000 MG tablet Take 1 tablet by mouth in the morning and at bedtime      glimepiride (AMARYL) 4 MG tablet Take 1 tablet by mouth every morning (before breakfast)      simvastatin (ZOCOR) 80 MG tablet Take 1 tablet by mouth daily      atenolol (TENORMIN) 50 MG tablet Take 1 tablet by mouth daily      lisinopril (PRINIVIL;ZESTRIL) 10 MG tablet Take 1 tablet by mouth daily      spironolactone (ALDACTONE) 25 MG tablet Take 1 tablet by mouth daily      aspirin 81 MG EC tablet Take 1 tablet by mouth daily      Multiple Vitamins-Minerals (MULTIVITAMIN ADULT, MINERALS, PO) Take by mouth daily Multivitamin plus calcium      Calcium Carbonate-Vitamin D (CALCIUM-VITAMIN D) 600-3.125 MG-MCG TABS Take by mouth daily      turmeric 500 MG CAPS Take 2 capsules by mouth in the morning and at bedtime      Omega-3 Fatty Acids (FISH OIL) 1000 MG capsule Take 1 capsule by mouth 2 times daily       No current facility-administered medications on file prior to visit.       \"Have you been to the ER, urgent care clinic since your last visit?  Hospitalized since your last visit?\"    NO    “Have you seen or consulted any other health care providers outside of [facility] since your last visit?”    YES - When: approximately 1 months ago.

## 2024-12-23 NOTE — PATIENT INSTRUCTIONS
Treatment Plan:  Continue Zytiga and prednisone as prescribed.  Continue Lupron, with the next injection scheduled for March 2025.  Hold off on Xgeva until clearance from [facility].  PSMA PET scan ordered before the next visit.  Zofran prescribed for nausea, to be taken as needed.  Repeat Calcium, Potassium levels next week; stop eating a daily banana.     Follow-Up Instructions:  Schedule a PSMA PET scan before the next visit.  Follow up with [facility] in March 2025 for clearance to resume Xgeva.  Monitor liver and kidney functions regularly.  Continue taking Zytiga, prednisone, and Lupron as prescribed.  Report any issues with the specialty pharmacy or medications.

## 2024-12-30 ENCOUNTER — HOSPITAL ENCOUNTER (OUTPATIENT)
Facility: HOSPITAL | Age: 59
Setting detail: INFUSION SERIES
Discharge: HOME | End: 2024-12-30
Payer: COMMERCIAL

## 2024-12-30 VITALS
HEIGHT: 68 IN | RESPIRATION RATE: 18 BRPM | DIASTOLIC BLOOD PRESSURE: 55 MMHG | BODY MASS INDEX: 35.68 KG/M2 | SYSTOLIC BLOOD PRESSURE: 108 MMHG | WEIGHT: 235.4 LBS | TEMPERATURE: 97.5 F | HEART RATE: 67 BPM | OXYGEN SATURATION: 96 %

## 2024-12-30 DIAGNOSIS — C79.51 PROSTATE CANCER METASTATIC TO BONE (HCC): Primary | ICD-10-CM

## 2024-12-30 DIAGNOSIS — C61 PROSTATE CANCER METASTATIC TO BONE (HCC): Primary | ICD-10-CM

## 2024-12-30 DIAGNOSIS — D72.820 LYMPHOCYTOSIS: ICD-10-CM

## 2024-12-30 PROCEDURE — 6360000002 HC RX W HCPCS: Performed by: INTERNAL MEDICINE

## 2024-12-30 PROCEDURE — 96402 CHEMO HORMON ANTINEOPL SQ/IM: CPT

## 2024-12-30 RX ORDER — ONDANSETRON 2 MG/ML
8 INJECTION INTRAMUSCULAR; INTRAVENOUS
OUTPATIENT
Start: 2025-01-26

## 2024-12-30 RX ORDER — SODIUM CHLORIDE 9 MG/ML
INJECTION, SOLUTION INTRAVENOUS CONTINUOUS
OUTPATIENT
Start: 2025-01-26

## 2024-12-30 RX ORDER — ACETAMINOPHEN 325 MG/1
650 TABLET ORAL
OUTPATIENT
Start: 2025-01-26

## 2024-12-30 RX ORDER — ALBUTEROL SULFATE 90 UG/1
4 INHALANT RESPIRATORY (INHALATION) PRN
OUTPATIENT
Start: 2025-01-26

## 2024-12-30 RX ORDER — FAMOTIDINE 10 MG/ML
20 INJECTION, SOLUTION INTRAVENOUS
OUTPATIENT
Start: 2025-01-26

## 2024-12-30 RX ORDER — HYDROCORTISONE SODIUM SUCCINATE 100 MG/2ML
100 INJECTION INTRAMUSCULAR; INTRAVENOUS
OUTPATIENT
Start: 2025-01-26

## 2024-12-30 RX ORDER — DIPHENHYDRAMINE HYDROCHLORIDE 50 MG/ML
50 INJECTION INTRAMUSCULAR; INTRAVENOUS
OUTPATIENT
Start: 2025-01-26

## 2024-12-30 RX ORDER — EPINEPHRINE 1 MG/ML
0.3 INJECTION, SOLUTION INTRAMUSCULAR; SUBCUTANEOUS PRN
OUTPATIENT
Start: 2025-01-26

## 2024-12-30 RX ADMIN — LEUPROLIDE ACETATE 22.5 MG: KIT at 15:00

## 2024-12-30 ASSESSMENT — PAIN SCALES - GENERAL: PAINLEVEL_OUTOF10: 1

## 2024-12-30 NOTE — PROGRESS NOTES
[facility] Progress Note    Date: 2024    Name: Kenroy Reno    MRN: [id number]         : 1965    Mr. Reno Arrived ambulatory and in no distress for Lupron Injection.  Assessment was completed, no acute issues at this time, no new complaints voiced. Left chest port accessed with 20G 0.75 inch jolly, flushed, and deaccessed. + blood return noted.       Mr. Reno's vitals were reviewed.  Vitals:    24 1445   BP: (!) 108/55   Pulse: 67   Resp: 18   Temp: 97.5 °F (36.4 °C)   SpO2: 96%       Medications:  Medications Administered         leuprolide (LUPRON) injection 22.5 mg Admin Date  2024 Action  Given Dose  22.5 mg Route  IntraMUSCular Documented By  Brianda Alegre, BECKY          Given in the [facility].     Mr. Reno tolerated treatment well and [facility].   Patient is aware of future appointments.    Future Appointments   Date Time Provider Department Center   3/24/2025  8:00 AM SS FASTTRACK 3 [facility] [place]   3/24/2025  8:45 AM Darvin Marion MD [facility]   3/24/2025  2:00 PM ANNY PET DOSE 1 [facility]   3/24/2025  3:00 PM ANNY PET 1 [facility]   2025 10:00 AM SS FASTTRACK 1 [facility] [place]       Brianda Alegre RN  2024

## 2024-12-31 ENCOUNTER — TELEPHONE (OUTPATIENT)
Age: 59
End: 2024-12-31

## 2024-12-31 NOTE — TELEPHONE ENCOUNTER
Lauryn from [employer] called in stating she's needing a prior authorization for Abiraterone. Please advise    CB# [phone]

## 2025-03-13 ENCOUNTER — TELEPHONE (OUTPATIENT)
Age: 60
End: 2025-03-13

## 2025-03-13 NOTE — TELEPHONE ENCOUNTER
03/13/25 at 1:31 PM contacted patient regarding labs done prior to apt. Verified ID x2. Patient stated will have labs done same day. Patient denies any further questions.

## 2025-03-24 ENCOUNTER — RESULTS FOLLOW-UP (OUTPATIENT)
Facility: HOSPITAL | Age: 60
End: 2025-03-24

## 2025-03-24 ENCOUNTER — HOSPITAL ENCOUNTER (OUTPATIENT)
Facility: HOSPITAL | Age: 60
Setting detail: INFUSION SERIES
Discharge: HOME | End: 2025-03-24
Payer: COMMERCIAL

## 2025-03-24 ENCOUNTER — OFFICE VISIT (OUTPATIENT)
Age: 60
End: 2025-03-24
Payer: COMMERCIAL

## 2025-03-24 VITALS
BODY MASS INDEX: 34.78 KG/M2 | HEART RATE: 75 BPM | SYSTOLIC BLOOD PRESSURE: 129 MMHG | HEIGHT: 68 IN | WEIGHT: 229.5 LBS | RESPIRATION RATE: 18 BRPM | OXYGEN SATURATION: 96 % | TEMPERATURE: 97.6 F | DIASTOLIC BLOOD PRESSURE: 78 MMHG

## 2025-03-24 VITALS
OXYGEN SATURATION: 98 % | DIASTOLIC BLOOD PRESSURE: 78 MMHG | HEIGHT: 68 IN | TEMPERATURE: 97.6 F | BODY MASS INDEX: 34.98 KG/M2 | HEART RATE: 74 BPM | SYSTOLIC BLOOD PRESSURE: 133 MMHG | WEIGHT: 230.8 LBS

## 2025-03-24 DIAGNOSIS — M87.180 OSTEONECROSIS OF JAW DUE TO DRUG: ICD-10-CM

## 2025-03-24 DIAGNOSIS — C61 PROSTATE CANCER METASTATIC TO BONE (HCC): Primary | ICD-10-CM

## 2025-03-24 DIAGNOSIS — C79.51 PROSTATE CANCER METASTATIC TO BONE (HCC): ICD-10-CM

## 2025-03-24 DIAGNOSIS — Z79.818 ANDROGEN DEPRIVATION THERAPY: ICD-10-CM

## 2025-03-24 DIAGNOSIS — C79.51 SECONDARY CARCINOMA OF BONE: ICD-10-CM

## 2025-03-24 DIAGNOSIS — C79.51 PROSTATE CANCER METASTATIC TO BONE (HCC): Primary | ICD-10-CM

## 2025-03-24 DIAGNOSIS — C61 PROSTATE CANCER METASTATIC TO BONE (HCC): ICD-10-CM

## 2025-03-24 DIAGNOSIS — Z79.899 HIGH RISK MEDICATION USE: ICD-10-CM

## 2025-03-24 DIAGNOSIS — D72.820 LYMPHOCYTOSIS: Primary | ICD-10-CM

## 2025-03-24 LAB
ALBUMIN SERPL-MCNC: 3.2 G/DL (ref 3.5–5)
ALBUMIN/GLOB SERPL: 0.8 (ref 1.1–2.2)
ALP SERPL-CCNC: 88 U/L (ref 45–117)
ALT SERPL-CCNC: 26 U/L (ref 12–78)
ANION GAP SERPL CALC-SCNC: 6 MMOL/L (ref 2–12)
AST SERPL-CCNC: 15 U/L (ref 15–37)
BASOPHILS # BLD: 0.06 K/UL (ref 0–0.1)
BASOPHILS NFR BLD: 0.8 % (ref 0–1)
BILIRUB SERPL-MCNC: 0.9 MG/DL (ref 0.2–1)
BUN SERPL-MCNC: 11 MG/DL (ref 6–20)
BUN/CREAT SERPL: 15 (ref 12–20)
CALCIUM SERPL-MCNC: 9.4 MG/DL (ref 8.5–10.1)
CHLORIDE SERPL-SCNC: 105 MMOL/L (ref 97–108)
CO2 SERPL-SCNC: 28 MMOL/L (ref 21–32)
CREAT SERPL-MCNC: 0.74 MG/DL (ref 0.7–1.3)
DIFFERENTIAL METHOD BLD: ABNORMAL
EOSINOPHIL # BLD: 0.22 K/UL (ref 0–0.4)
EOSINOPHIL NFR BLD: 3 % (ref 0–7)
ERYTHROCYTE [DISTWIDTH] IN BLOOD BY AUTOMATED COUNT: 13.4 % (ref 11.5–14.5)
GLOBULIN SER CALC-MCNC: 3.8 G/DL (ref 2–4)
GLUCOSE SERPL-MCNC: 242 MG/DL (ref 65–100)
HCT VFR BLD AUTO: 36.3 % (ref 36.6–50.3)
HGB BLD-MCNC: 12.3 G/DL (ref 12.1–17)
IMM GRANULOCYTES # BLD AUTO: 0.03 K/UL (ref 0–0.04)
IMM GRANULOCYTES NFR BLD AUTO: 0.4 % (ref 0–0.5)
LYMPHOCYTES # BLD: 2.45 K/UL (ref 0.8–3.5)
LYMPHOCYTES NFR BLD: 33.5 % (ref 12–49)
MCH RBC QN AUTO: 32.7 PG (ref 26–34)
MCHC RBC AUTO-ENTMCNC: 33.9 G/DL (ref 30–36.5)
MCV RBC AUTO: 96.5 FL (ref 80–99)
MONOCYTES # BLD: 0.64 K/UL (ref 0–1)
MONOCYTES NFR BLD: 8.7 % (ref 5–13)
NEUTS SEG # BLD: 3.92 K/UL (ref 1.8–8)
NEUTS SEG NFR BLD: 53.6 % (ref 32–75)
NRBC # BLD: 0 K/UL (ref 0–0.01)
NRBC BLD-RTO: 0 PER 100 WBC
PERIPHERAL SMEAR, MD REVIEW: NORMAL
PLATELET # BLD AUTO: 265 K/UL (ref 150–400)
PMV BLD AUTO: 9.5 FL (ref 8.9–12.9)
POTASSIUM SERPL-SCNC: 3.7 MMOL/L (ref 3.5–5.1)
PROT SERPL-MCNC: 7 G/DL (ref 6.4–8.2)
PSA SERPL-MCNC: 1 NG/ML (ref 0.01–4)
RBC # BLD AUTO: 3.76 M/UL (ref 4.1–5.7)
SODIUM SERPL-SCNC: 139 MMOL/L (ref 136–145)
WBC # BLD AUTO: 7.3 K/UL (ref 4.1–11.1)

## 2025-03-24 PROCEDURE — 85025 COMPLETE CBC W/AUTO DIFF WBC: CPT

## 2025-03-24 PROCEDURE — 84403 ASSAY OF TOTAL TESTOSTERONE: CPT

## 2025-03-24 PROCEDURE — 84153 ASSAY OF PSA TOTAL: CPT

## 2025-03-24 PROCEDURE — 99214 OFFICE O/P EST MOD 30 MIN: CPT | Performed by: INTERNAL MEDICINE

## 2025-03-24 PROCEDURE — 6360000002 HC RX W HCPCS: Performed by: INTERNAL MEDICINE

## 2025-03-24 PROCEDURE — 80053 COMPREHEN METABOLIC PANEL: CPT

## 2025-03-24 PROCEDURE — 96402 CHEMO HORMON ANTINEOPL SQ/IM: CPT

## 2025-03-24 RX ORDER — ONDANSETRON 2 MG/ML
8 INJECTION INTRAMUSCULAR; INTRAVENOUS
OUTPATIENT
Start: 2025-04-20

## 2025-03-24 RX ORDER — MULTIVIT WITH MINERALS/LUTEIN
1000 TABLET ORAL DAILY
COMMUNITY
Start: 2025-03-11 | End: 2025-05-10

## 2025-03-24 RX ORDER — ALBUTEROL SULFATE 90 UG/1
4 INHALANT RESPIRATORY (INHALATION) PRN
OUTPATIENT
Start: 2025-04-20

## 2025-03-24 RX ORDER — CHLORHEXIDINE GLUCONATE ORAL RINSE 1.2 MG/ML
15 SOLUTION DENTAL 2 TIMES DAILY
COMMUNITY
Start: 2024-11-26

## 2025-03-24 RX ORDER — DIPHENHYDRAMINE HYDROCHLORIDE 50 MG/ML
50 INJECTION, SOLUTION INTRAMUSCULAR; INTRAVENOUS
OUTPATIENT
Start: 2025-04-20

## 2025-03-24 RX ORDER — EPINEPHRINE 1 MG/ML
0.3 INJECTION, SOLUTION INTRAMUSCULAR; SUBCUTANEOUS PRN
OUTPATIENT
Start: 2025-04-20

## 2025-03-24 RX ORDER — ACETAMINOPHEN 325 MG/1
650 TABLET ORAL
OUTPATIENT
Start: 2025-04-20

## 2025-03-24 RX ORDER — FAMOTIDINE 10 MG/ML
20 INJECTION, SOLUTION INTRAVENOUS
OUTPATIENT
Start: 2025-04-20

## 2025-03-24 RX ORDER — SODIUM CHLORIDE 9 MG/ML
INJECTION, SOLUTION INTRAVENOUS CONTINUOUS
OUTPATIENT
Start: 2025-04-20

## 2025-03-24 RX ORDER — HYDROCORTISONE SODIUM SUCCINATE 100 MG/2ML
100 INJECTION INTRAMUSCULAR; INTRAVENOUS
OUTPATIENT
Start: 2025-04-20

## 2025-03-24 RX ADMIN — LEUPROLIDE ACETATE 22.5 MG: KIT at 08:26

## 2025-03-24 ASSESSMENT — PAIN DESCRIPTION - DESCRIPTORS: DESCRIPTORS: ACHING

## 2025-03-24 ASSESSMENT — PAIN DESCRIPTION - LOCATION: LOCATION: GENERALIZED

## 2025-03-24 ASSESSMENT — PAIN SCALES - GENERAL: PAINLEVEL_OUTOF10: 3

## 2025-03-24 ASSESSMENT — PAIN DESCRIPTION - ORIENTATION: ORIENTATION: RIGHT;ANTERIOR

## 2025-03-24 NOTE — PROGRESS NOTES
[facility]        Date: 2025    Name: Kenroy Reno    MRN: [id number]         : 1965    Mr. Reno Arrived ambulatory and in no distress for Lupron (RGM) Regimen.  Assessment was completed, no acute issues at this time, no new complaints voiced.  Chest port accessed with +blood return. Lab drawn and sent for processing. Criteria are met for today treatment.       Mr. Reno's vitals were reviewed.  Patient Vitals for the past 12 hrs:   Temp Pulse Resp BP SpO2   25 0800 97.6 °F (36.4 °C) 75 18 129/78 96 %         Lab results were obtained and reviewed.  No results found for this or any previous visit (from the past 12 hours).    Labs are pending in epic.     Medications received:  Medications Administered         leuprolide (LUPRON) injection 22.5 mg Admin Date  2025 Action  Given Dose  22.5 mg Route  IntraMUSCular Documented By  Mini Ace, RN             Mr. Reno tolerated treatment well and was [facility] in stable condition at 0830 to MD appointment with medical oncology. Chest port flushed and de-accessed per protocol. He is to return on  2025 at 1000 for his next appointment.    Mini Ace RN  2025    Future Appointments:  Future Appointments   Date Time Provider Department Center   3/24/2025  8:45 AM Darvin Marion MD [facility]   2025  3:00 PM ANNY PET DOSE 1 [facility]   2025  4:00 PM ANNY PET 1 [facility]   2025 10:00 AM SS FASTTRACK 1 [facility]

## 2025-03-24 NOTE — PROGRESS NOTES
Cancer [place] at [facility]  [address], [address] [place] [address]  W: [phone]  F: [phone] Patient ID  Name: Kenroy Reno  YOB: 1965  MRN: [id number]  Referring Provider:   No referring provider defined for this encounter.  Primary Care Provider:   Jose Shanks MD       HEMATOLOGY/MEDICAL ONCOLOGY  NOTE     Reason for Evaluation:     Chief Complaint   Patient presents with    Chemotherapy     Subjective:     Oncology History:   Please review original records for clinical decision making. This summary highlights focused aspects of patient's ongoing care and may have a recurring section in notes with either updates or remain unchanged as a longitudinal care summary.  ______________________________________________________  DIAGNOSIS: METASTATIC/RECURRENT PROSTATE CANCER  <>PATHOLOGY<>  [id number] PROSTATE BIOPSY;  [facility]  2/3/2011  ADENOCARCINOMA (PIA RIGHT MID LATERAL, RIGHT BASE MEDIAL, RIGHT BASE LATERAL:  (PIA 4+5=9)     <>STAGING<> RECURRENT DISEASE, Bone Disease, lymph nodes mediastinal,bilateral hilar. Extensive bone disease in thoracic, Lumbar, appendicular skeleton.   Cancer Staging   --     <>GOALS OF CARE<>PALLIATIVE INTENT  <>CURRENT TREATMENT<>Abiraterone 1000mg ,Prednisone 5mg every 12h  <>PRIOR TREATMENT> Per outside Records ([facility] note from 4/22/20224 (Dr. Price): Prostatectomy 3/2011 (Dr. Sosa). Rise in PSA 7/2014, ADT 2014.    Abitraterone/Prednisone 1/2023--? Completed 6 cycles of taxotere from 1/2023. Started Xgeva 7/2023.  <>BIOMARKERS<>  -- PSA 0.6 from 4/15/24  0.5 9/7/23,  1.5 on 4/24/23, PSA 3.0 from 1/30/23. PSA down to 1.7 on 3/13/23.  PSMA PET on 7/23 reportedly showed treatment response.    History of Present Illness:   Date of Visit: 3/24/2025  Kenroy Reno is a 60 y.o. male who presents for follow-up management for PALLIATIVE INTENT systemic therapy for metastatic prostate cancer.   # Saw his dental

## 2025-03-24 NOTE — PROGRESS NOTES
Kenroy Reno is a 60 y.o. male   Chemotherapy    Vitals:    03/24/25 0841   BP: 133/78   BP Site: Right Upper Arm   Pulse: 74   Temp: 97.6 °F (36.4 °C)   SpO2: 98%   Weight: 104.7 kg (230 lb 12.8 oz)   Height: 1.727 m (5' 8\")     No LMP for male patient.    \"Have you been to the ER, urgent care clinic since your last visit?  Hospitalized since your last visit?\"    NO    “Have you seen or consulted any other health care providers outside of [facility] since your last visit?”    NO

## 2025-03-25 LAB — TESTOST SERPL-MCNC: <3 NG/DL (ref 264–916)

## 2025-04-07 ENCOUNTER — HOSPITAL ENCOUNTER (OUTPATIENT)
Facility: HOSPITAL | Age: 60
Discharge: HOME | End: 2025-04-10
Attending: INTERNAL MEDICINE
Payer: COMMERCIAL

## 2025-04-07 VITALS — BODY MASS INDEX: 35.58 KG/M2 | WEIGHT: 234 LBS

## 2025-04-07 DIAGNOSIS — C61 PROSTATE CANCER METASTATIC TO BONE (HCC): ICD-10-CM

## 2025-04-07 DIAGNOSIS — C79.51 PROSTATE CANCER METASTATIC TO BONE (HCC): ICD-10-CM

## 2025-04-07 PROCEDURE — 3430000000 HC RX DIAGNOSTIC RADIOPHARMACEUTICAL: Performed by: INTERNAL MEDICINE

## 2025-04-07 PROCEDURE — A9595 HC RX DIAGNOSTIC RADIOPHARMACEUTICAL: HCPCS | Performed by: INTERNAL MEDICINE

## 2025-04-07 PROCEDURE — 78815 PET IMAGE W/CT SKULL-THIGH: CPT

## 2025-04-07 RX ADMIN — PIFLUFOLASTAT F-18 9 MILLICURIE: 80 INJECTION INTRAVENOUS at 14:47

## 2025-04-08 ENCOUNTER — RESULTS FOLLOW-UP (OUTPATIENT)
Age: 60
End: 2025-04-08

## 2025-06-16 ENCOUNTER — OFFICE VISIT (OUTPATIENT)
Age: 60
End: 2025-06-16
Payer: COMMERCIAL

## 2025-06-16 ENCOUNTER — HOSPITAL ENCOUNTER (OUTPATIENT)
Facility: HOSPITAL | Age: 60
Setting detail: INFUSION SERIES
Discharge: HOME | End: 2025-06-16
Payer: COMMERCIAL

## 2025-06-16 VITALS
OXYGEN SATURATION: 98 % | BODY MASS INDEX: 37.44 KG/M2 | HEIGHT: 68 IN | WEIGHT: 247 LBS | SYSTOLIC BLOOD PRESSURE: 103 MMHG | DIASTOLIC BLOOD PRESSURE: 48 MMHG | HEART RATE: 65 BPM

## 2025-06-16 VITALS
DIASTOLIC BLOOD PRESSURE: 57 MMHG | HEIGHT: 68 IN | RESPIRATION RATE: 18 BRPM | HEART RATE: 64 BPM | OXYGEN SATURATION: 95 % | WEIGHT: 245 LBS | TEMPERATURE: 98.1 F | BODY MASS INDEX: 37.13 KG/M2 | SYSTOLIC BLOOD PRESSURE: 98 MMHG

## 2025-06-16 DIAGNOSIS — C79.51 PROSTATE CANCER METASTATIC TO BONE (HCC): ICD-10-CM

## 2025-06-16 DIAGNOSIS — R23.2 HOT FLASH DUE TO MEDICATION: ICD-10-CM

## 2025-06-16 DIAGNOSIS — C79.51 PROSTATE CANCER METASTATIC TO BONE (HCC): Primary | ICD-10-CM

## 2025-06-16 DIAGNOSIS — C61 PROSTATE CANCER METASTATIC TO BONE (HCC): ICD-10-CM

## 2025-06-16 DIAGNOSIS — C61 PROSTATE CANCER METASTATIC TO BONE (HCC): Primary | ICD-10-CM

## 2025-06-16 DIAGNOSIS — T50.905A HOT FLASH DUE TO MEDICATION: ICD-10-CM

## 2025-06-16 DIAGNOSIS — D72.820 LYMPHOCYTOSIS: Primary | ICD-10-CM

## 2025-06-16 LAB
ALBUMIN SERPL-MCNC: 3.5 G/DL (ref 3.5–5)
ALBUMIN/GLOB SERPL: 1 (ref 1.1–2.2)
ALP SERPL-CCNC: 106 U/L (ref 45–117)
ALT SERPL-CCNC: 25 U/L (ref 12–78)
ANION GAP SERPL CALC-SCNC: 7 MMOL/L (ref 2–12)
AST SERPL-CCNC: 15 U/L (ref 15–37)
BASOPHILS # BLD: 0.05 K/UL (ref 0–0.1)
BASOPHILS NFR BLD: 0.4 % (ref 0–1)
BILIRUB SERPL-MCNC: 0.7 MG/DL (ref 0.2–1)
BUN SERPL-MCNC: 21 MG/DL (ref 6–20)
BUN/CREAT SERPL: 18 (ref 12–20)
CALCIUM SERPL-MCNC: 9.4 MG/DL (ref 8.5–10.1)
CHLORIDE SERPL-SCNC: 105 MMOL/L (ref 97–108)
CO2 SERPL-SCNC: 28 MMOL/L (ref 21–32)
CREAT SERPL-MCNC: 1.19 MG/DL (ref 0.7–1.3)
DIFFERENTIAL METHOD BLD: ABNORMAL
EOSINOPHIL # BLD: 0.08 K/UL (ref 0–0.4)
EOSINOPHIL NFR BLD: 0.7 % (ref 0–7)
ERYTHROCYTE [DISTWIDTH] IN BLOOD BY AUTOMATED COUNT: 13.5 % (ref 11.5–14.5)
GLOBULIN SER CALC-MCNC: 3.6 G/DL (ref 2–4)
GLUCOSE SERPL-MCNC: 153 MG/DL (ref 65–100)
HCT VFR BLD AUTO: 36.6 % (ref 36.6–50.3)
HGB BLD-MCNC: 12.6 G/DL (ref 12.1–17)
IMM GRANULOCYTES # BLD AUTO: 0.11 K/UL (ref 0–0.04)
IMM GRANULOCYTES NFR BLD AUTO: 0.9 % (ref 0–0.5)
LYMPHOCYTES # BLD: 1.87 K/UL (ref 0.8–3.5)
LYMPHOCYTES NFR BLD: 15.6 % (ref 12–49)
MCH RBC QN AUTO: 32.9 PG (ref 26–34)
MCHC RBC AUTO-ENTMCNC: 34.4 G/DL (ref 30–36.5)
MCV RBC AUTO: 95.6 FL (ref 80–99)
MONOCYTES # BLD: 0.74 K/UL (ref 0–1)
MONOCYTES NFR BLD: 6.2 % (ref 5–13)
NEUTS SEG # BLD: 9.1 K/UL (ref 1.8–8)
NEUTS SEG NFR BLD: 76.2 % (ref 32–75)
NRBC # BLD: 0 K/UL (ref 0–0.01)
NRBC BLD-RTO: 0 PER 100 WBC
PLATELET # BLD AUTO: 255 K/UL (ref 150–400)
PMV BLD AUTO: 10.7 FL (ref 8.9–12.9)
POTASSIUM SERPL-SCNC: 4 MMOL/L (ref 3.5–5.1)
PROT SERPL-MCNC: 7.1 G/DL (ref 6.4–8.2)
PSA SERPL-MCNC: 2 NG/ML (ref 0.01–4)
RBC # BLD AUTO: 3.83 M/UL (ref 4.1–5.7)
SODIUM SERPL-SCNC: 140 MMOL/L (ref 136–145)
WBC # BLD AUTO: 12 K/UL (ref 4.1–11.1)

## 2025-06-16 PROCEDURE — 84403 ASSAY OF TOTAL TESTOSTERONE: CPT

## 2025-06-16 PROCEDURE — 80053 COMPREHEN METABOLIC PANEL: CPT

## 2025-06-16 PROCEDURE — 96402 CHEMO HORMON ANTINEOPL SQ/IM: CPT

## 2025-06-16 PROCEDURE — 85025 COMPLETE CBC W/AUTO DIFF WBC: CPT

## 2025-06-16 PROCEDURE — 6360000002 HC RX W HCPCS: Performed by: INTERNAL MEDICINE

## 2025-06-16 PROCEDURE — 99214 OFFICE O/P EST MOD 30 MIN: CPT | Performed by: INTERNAL MEDICINE

## 2025-06-16 PROCEDURE — 84153 ASSAY OF PSA TOTAL: CPT

## 2025-06-16 RX ORDER — ACETAMINOPHEN 325 MG/1
650 TABLET ORAL
OUTPATIENT
Start: 2025-07-13

## 2025-06-16 RX ORDER — DIPHENHYDRAMINE HYDROCHLORIDE 50 MG/ML
50 INJECTION, SOLUTION INTRAMUSCULAR; INTRAVENOUS
OUTPATIENT
Start: 2025-07-13

## 2025-06-16 RX ORDER — ALBUTEROL SULFATE 90 UG/1
4 INHALANT RESPIRATORY (INHALATION) PRN
OUTPATIENT
Start: 2025-07-13

## 2025-06-16 RX ORDER — ONDANSETRON 2 MG/ML
8 INJECTION INTRAMUSCULAR; INTRAVENOUS
OUTPATIENT
Start: 2025-07-13

## 2025-06-16 RX ORDER — SODIUM CHLORIDE 9 MG/ML
INJECTION, SOLUTION INTRAVENOUS CONTINUOUS
OUTPATIENT
Start: 2025-07-13

## 2025-06-16 RX ORDER — EPINEPHRINE 1 MG/ML
0.3 INJECTION, SOLUTION INTRAMUSCULAR; SUBCUTANEOUS PRN
OUTPATIENT
Start: 2025-07-13

## 2025-06-16 RX ORDER — HYDROCORTISONE SODIUM SUCCINATE 100 MG/2ML
100 INJECTION INTRAMUSCULAR; INTRAVENOUS
OUTPATIENT
Start: 2025-07-13

## 2025-06-16 RX ORDER — PREGABALIN 150 MG/1
150 CAPSULE ORAL 2 TIMES DAILY
COMMUNITY
Start: 2025-06-13

## 2025-06-16 RX ORDER — FAMOTIDINE 10 MG/ML
20 INJECTION, SOLUTION INTRAVENOUS
OUTPATIENT
Start: 2025-07-13

## 2025-06-16 RX ADMIN — LEUPROLIDE ACETATE 22.5 MG: KIT at 10:17

## 2025-06-16 ASSESSMENT — PAIN DESCRIPTION - DESCRIPTORS: DESCRIPTORS: PINS AND NEEDLES

## 2025-06-16 ASSESSMENT — PAIN DESCRIPTION - LOCATION: LOCATION: GENERALIZED

## 2025-06-16 ASSESSMENT — PAIN SCALES - GENERAL: PAINLEVEL_OUTOF10: 5

## 2025-06-16 NOTE — PROGRESS NOTES
[facility] [place] at [facility]  [address], [address] [facility] [address]  W: [phone]  F: [phone] Patient ID  Name: Kenroy Reno  YOB: 1965  MRN: [id number]  Referring Provider:   No referring provider defined for this encounter.  Primary Care Provider:   Jose Shanks MD       HEMATOLOGY/MEDICAL ONCOLOGY  NOTE     Reason for Evaluation:     Chief Complaint   Patient presents with    Chemotherapy     Subjective:     Oncology History:   Please review original records for clinical decision making. This summary highlights focused aspects of patient's ongoing care and may have a recurring section in notes with either updates or remain unchanged as a longitudinal care summary.  ______________________________________________________  DIAGNOSIS: METASTATIC/RECURRENT PROSTATE CANCER  <>PATHOLOGY<>  [id number] PROSTATE BIOPSY;  [facility]  2/3/2011  ADENOCARCINOMA (PIA RIGHT MID LATERAL, RIGHT BASE MEDIAL, RIGHT BASE LATERAL:  (PIA 4+5=9)     <>STAGING<> RECURRENT DISEASE, Bone Disease, lymph nodes mediastinal,bilateral hilar. Extensive bone disease in thoracic, Lumbar, appendicular skeleton.   Cancer Staging   --     <>GOALS OF CARE<>PALLIATIVE INTENT  <>CURRENT TREATMENT<>Abiraterone 1000mg ,Prednisone 5mg every 12h, Lupron every 3 months  <>PRIOR TREATMENT> Per outside Records ([facility] note from 4/22/20224 (Dr. Price): Prostatectomy 3/2011 (Dr. Sosa). Rise in PSA 7/2014, ADT 2014.    Abitraterone/Prednisone 1/2023--? Completed 6 cycles of taxotere from 1/2023. Started Xgeva 7/2023.  <>BIOMARKERS<>  -- PSA 0.6 from 4/15/24  0.5 9/7/23,  1.5 on 4/24/23, PSA 3.0 from 1/30/23. PSA down to 1.7 on 3/13/23.  Lab Results   Component Value Date    PSA 1.0 03/24/2025     PSMA PET on 7/23 reportedly showed treatment response.    History of Present Illness:   Date of Visit: 6/16/2025  Kenroy Reno is a 60 y.o. male who presents for a follow-up evaluation for

## 2025-06-16 NOTE — PROGRESS NOTES
Kenroy Reno is a 60 y.o. male   Chemotherapy  Patient denies any symptoms  Vitals:    06/16/25 1040   BP: (!) 110/49   Pulse: 65   SpO2: 98%   Weight: 112 kg (247 lb)   Height: 1.727 m (5' 8\")     No LMP for male patient.    \"Have you been to the ER, urgent care clinic since your last visit?  Hospitalized since your last visit?\"    NO    “Have you seen or consulted any other health care providers outside of [facility] since your last visit?”    NO

## 2025-06-16 NOTE — PROGRESS NOTES
[facility] Progress Note    Date: 2025    Name: Kenroy Reno    MRN: [id number]         : 1965    Mr. Reno Arrived ambulatory and in no distress for Lupron/Labs Injection.  Assessment was completed, no acute issues at this time, no new complaints voiced.  Port accessed, labs drawn and sent for processing.      Mr. Reno's vitals were reviewed.  Vitals:    25 0945   BP: (!) 98/57   Pulse: 64   Resp: 18   Temp: 98.1 °F (36.7 °C)   SpO2: 95%         Medications:  Medications Administered         leuprolide (LUPRON) injection 22.5 mg Admin Date  2025 Action  Given Dose  22.5 mg Route  Intra[facility] Documented By  Rakesh Velasco RN          Lupron given in [facility].    Two nurses verified prior to administering:  drug name, drug dose, infusion volume or drug volume when prepared in a syringe, rate of administration, route of administration, expiration dates and/or times, appearance and physical integrity of the drugs, rate set on infusion pump, when used, sequencing of drug administration.     Potential side effects and signs/symptoms of reaction for treatment regimen were discussed. Additionally, when patient should call/notify MD was reviewed.    Mr. Reno tolerated treatment well and [facility].   Patient is aware of their next appointment.    Rakesh Velasco RN  2025

## 2025-06-17 ENCOUNTER — TELEPHONE (OUTPATIENT)
Age: 60
End: 2025-06-17

## 2025-06-17 LAB — TESTOST SERPL-MCNC: <3 NG/DL (ref 264–916)

## 2025-06-17 NOTE — TELEPHONE ENCOUNTER
Patient called and stated that he would like a call back to discuss his PSA blood results and talk about scheduling a PET scan.     # [phone]

## 2025-06-18 DIAGNOSIS — C61 PROSTATE CANCER METASTATIC TO BONE (HCC): Primary | ICD-10-CM

## 2025-06-18 DIAGNOSIS — C79.51 PROSTATE CANCER METASTATIC TO BONE (HCC): Primary | ICD-10-CM

## 2025-06-18 NOTE — TELEPHONE ENCOUNTER
6/18/27 5:27 p.m.- Spoke to patient and wife, and advised them that per Dr. Marion, new orders had been placed for PET scan to be completed now instead of September. Patient voices understanding and states they will call centralized scheduling to get set up. Patient denies any further questions at this time.

## 2025-06-18 NOTE — TELEPHONE ENCOUNTER
6/17/25 5:28 p.m.- Spoke to patient and wife and patient states that he is concerned about his PSA level going up to 2.0 on labs drawn 6/16/25. Patient states that Dr. Marion had already placed an order for PET scan, but ordered it to be completed in September and patient is inquiring if he could get the PET scan done sooner since his PSA level is going up. Advised patient that Dr. Marion would be made aware of above note and that our office would call back tomorrow with recommendations. Patient voices understanding and denies any further questions at this time.

## 2025-06-19 ENCOUNTER — RESULTS FOLLOW-UP (OUTPATIENT)
Age: 60
End: 2025-06-19

## 2025-06-20 ENCOUNTER — CLINICAL DOCUMENTATION (OUTPATIENT)
Age: 60
End: 2025-06-20

## 2025-06-20 NOTE — PROGRESS NOTES
Message from Dr. Marion asking me to reach out regarding a bill patient received from [employer] for germline testing.    Called and spoke with patient. He explained that he did not receive a call from [facility] prior to testing. However, he recently received a bill for around $500 from [facility].    I called [facility] directly. They explained that at the time the test was ordered the patient's insurance estimated it would cost $0 out of pocket. However, following submission of the claim, the insurance stated it would be around $500 which is the amount on the bill the patient was sent. [facility] representative reminded me about the patient assistance program and the dispute form [facility] utilizes when this rare situation occurs.    I called patient and informed him of this information and provided him with [facility]'s contact numbers. He stated he would call billing to discuss the above options. All questions answered to the best of my ability.    I received a message from the patient next day explaining that he had successfully gotten in touch with [facility] and they were helping him complete paperwork to reduce the bill and come to an affordable solution.    Patient stated he would keep me posted if there were any additional difficulties.

## 2025-07-02 DIAGNOSIS — C61 PROSTATE CANCER (HCC): ICD-10-CM

## 2025-07-02 RX ORDER — ABIRATERONE ACETATE 250 MG/1
1000 TABLET ORAL DAILY
Qty: 120 TABLET | Refills: 5 | Status: ACTIVE | OUTPATIENT
Start: 2025-07-02

## 2025-07-14 ENCOUNTER — TELEPHONE (OUTPATIENT)
Age: 60
End: 2025-07-14

## 2025-07-14 ENCOUNTER — HOSPITAL ENCOUNTER (OUTPATIENT)
Facility: HOSPITAL | Age: 60
Discharge: HOME | End: 2025-07-17
Attending: INTERNAL MEDICINE
Payer: COMMERCIAL

## 2025-07-14 VITALS — WEIGHT: 247 LBS | BODY MASS INDEX: 37.56 KG/M2

## 2025-07-14 DIAGNOSIS — C61 PROSTATE CANCER METASTATIC TO BONE (HCC): ICD-10-CM

## 2025-07-14 DIAGNOSIS — C79.51 PROSTATE CANCER METASTATIC TO BONE (HCC): ICD-10-CM

## 2025-07-14 PROCEDURE — 78815 PET IMAGE W/CT SKULL-THIGH: CPT

## 2025-07-14 PROCEDURE — 3430000000 HC RX DIAGNOSTIC RADIOPHARMACEUTICAL: Performed by: INTERNAL MEDICINE

## 2025-07-14 PROCEDURE — A9595 HC RX DIAGNOSTIC RADIOPHARMACEUTICAL: HCPCS | Performed by: INTERNAL MEDICINE

## 2025-07-14 RX ADMIN — PIFLUFOLASTAT F-18 9 MILLICURIE: 80 INJECTION INTRAVENOUS at 13:58

## 2025-07-14 NOTE — TELEPHONE ENCOUNTER
Pt called in requesting to reschedule his upcoming appt and his spouse. Pt stated that he has to work. Pt wanted to know if him and his spouse could be scheduled at the same time next week. Please advise     CB# [phone]

## 2025-07-23 ENCOUNTER — OFFICE VISIT (OUTPATIENT)
Age: 60
End: 2025-07-23
Payer: COMMERCIAL

## 2025-07-23 VITALS
BODY MASS INDEX: 37.28 KG/M2 | HEART RATE: 67 BPM | DIASTOLIC BLOOD PRESSURE: 87 MMHG | TEMPERATURE: 97.6 F | WEIGHT: 246 LBS | SYSTOLIC BLOOD PRESSURE: 129 MMHG | OXYGEN SATURATION: 97 % | HEIGHT: 68 IN | RESPIRATION RATE: 16 BRPM

## 2025-07-23 DIAGNOSIS — C61 PROSTATE CANCER METASTATIC TO BONE (HCC): ICD-10-CM

## 2025-07-23 DIAGNOSIS — T45.1X5A NEUROPATHY DUE TO CHEMOTHERAPEUTIC DRUG: Primary | ICD-10-CM

## 2025-07-23 DIAGNOSIS — G62.0 NEUROPATHY DUE TO CHEMOTHERAPEUTIC DRUG: Primary | ICD-10-CM

## 2025-07-23 DIAGNOSIS — C79.51 PROSTATE CANCER METASTATIC TO BONE (HCC): ICD-10-CM

## 2025-07-23 DIAGNOSIS — M25.511 CHRONIC RIGHT SHOULDER PAIN: ICD-10-CM

## 2025-07-23 DIAGNOSIS — G89.29 CHRONIC RIGHT SHOULDER PAIN: ICD-10-CM

## 2025-07-23 PROCEDURE — 99214 OFFICE O/P EST MOD 30 MIN: CPT | Performed by: INTERNAL MEDICINE

## 2025-07-23 NOTE — PROGRESS NOTES
Kenroy Reno is a 60 y.o. male follow up for         1. Have you been to the ER, urgent care clinic since your last visit?  Hospitalized since your last visit?{no    2. Have you seen or consulted any other health care providers outside of the [facility] since your last visit?  Include any pap smears or colon screening. no

## 2025-07-23 NOTE — PROGRESS NOTES
[facility] [place] at [facility]  [address], [address] [facility] [address]  W: [phone]  F: [phone] Patient ID  Name: Kenroy Reno  YOB: 1965  MRN: [id number]  Referring Provider:   No referring provider defined for this encounter.  Primary Care Provider:   Jose Shanks MD       HEMATOLOGY/MEDICAL ONCOLOGY  NOTE     Reason for Evaluation:     Chief Complaint   Patient presents with    Follow-up     Subjective:     Oncology History:   Please review original records for clinical decision making. This summary highlights focused aspects of patient's ongoing care and may have a recurring section in notes with either updates or remain unchanged as a longitudinal care summary.  ______________________________________________________  DIAGNOSIS: METASTATIC/RECURRENT PROSTATE CANCER  <>PATHOLOGY<>  [id number] PROSTATE BIOPSY;  [facility]  2/3/2011  ADENOCARCINOMA (PIA RIGHT MID LATERAL, RIGHT BASE MEDIAL, RIGHT BASE LATERAL:  (PIA 4+5=9)     <>STAGING<> RECURRENT DISEASE, Bone Disease, lymph nodes mediastinal,bilateral hilar. Extensive bone disease in thoracic, Lumbar, appendicular skeleton.   Cancer Staging   --   <>GOALS OF CARE<>PALLIATIVE INTENT  <>CURRENT TREATMENT<>Abiraterone 1000mg ,Prednisone 5mg every 12h, Lupron every 3 months  <>PRIOR TREATMENT> Per outside Records ([facility] note from 4/22/20224 (Dr. Price): Prostatectomy 3/2011 (Dr. Sosa). Rise in PSA 7/2014, ADT 2014.    Abitraterone/Prednisone 1/2023--? Completed 6 cycles of taxotere from 1/2023. Started Xgeva 7/2023.  <>BIOMARKERS<>  -- PSA 0.6 from 4/15/24  0.5 9/7/23,  1.5 on 4/24/23, PSA 3.0 from 1/30/23. PSA down to 1.7 on 3/13/23.  Lab Results   Component Value Date    PSA 1.0 03/24/2025     PSMA PET on 7/2023 reportedly showed treatment response.  PET CT PSMA TUMOR IMAGE SKULL THIGH 07/14/2025   1. Progression of mediastinal/right hilar brook metastases.  2. Stable skeletal

## 2025-08-28 DIAGNOSIS — C79.51 PROSTATE CANCER METASTATIC TO BONE (HCC): ICD-10-CM

## 2025-08-28 DIAGNOSIS — C61 PROSTATE CANCER METASTATIC TO BONE (HCC): ICD-10-CM

## 2025-08-28 DIAGNOSIS — D72.820 LYMPHOCYTOSIS: Primary | ICD-10-CM

## 2025-08-28 RX ORDER — ACETAMINOPHEN 325 MG/1
650 TABLET ORAL
OUTPATIENT
Start: 2025-09-03

## 2025-08-28 RX ORDER — ALBUTEROL SULFATE 90 UG/1
4 INHALANT RESPIRATORY (INHALATION) PRN
OUTPATIENT
Start: 2025-09-03

## 2025-08-28 RX ORDER — FAMOTIDINE 10 MG/ML
20 INJECTION, SOLUTION INTRAVENOUS
OUTPATIENT
Start: 2025-09-03

## 2025-08-28 RX ORDER — DIPHENHYDRAMINE HYDROCHLORIDE 50 MG/ML
50 INJECTION, SOLUTION INTRAMUSCULAR; INTRAVENOUS
OUTPATIENT
Start: 2025-09-03

## 2025-08-28 RX ORDER — HYDROCORTISONE SODIUM SUCCINATE 100 MG/2ML
100 INJECTION INTRAMUSCULAR; INTRAVENOUS
OUTPATIENT
Start: 2025-09-03

## 2025-08-28 RX ORDER — EPINEPHRINE 1 MG/ML
0.3 INJECTION, SOLUTION, CONCENTRATE INTRAVENOUS PRN
OUTPATIENT
Start: 2025-09-03

## 2025-08-28 RX ORDER — SODIUM CHLORIDE 9 MG/ML
INJECTION, SOLUTION INTRAVENOUS CONTINUOUS
OUTPATIENT
Start: 2025-09-03

## 2025-08-28 RX ORDER — ONDANSETRON 2 MG/ML
8 INJECTION INTRAMUSCULAR; INTRAVENOUS
OUTPATIENT
Start: 2025-09-03

## 2025-09-03 ENCOUNTER — HOSPITAL ENCOUNTER (OUTPATIENT)
Facility: HOSPITAL | Age: 60
Setting detail: INFUSION SERIES
Discharge: HOME | End: 2025-09-03
Payer: COMMERCIAL

## 2025-09-03 VITALS
DIASTOLIC BLOOD PRESSURE: 71 MMHG | SYSTOLIC BLOOD PRESSURE: 123 MMHG | TEMPERATURE: 97.3 F | OXYGEN SATURATION: 98 % | WEIGHT: 242.7 LBS | HEIGHT: 68 IN | RESPIRATION RATE: 18 BRPM | HEART RATE: 61 BPM | BODY MASS INDEX: 36.78 KG/M2

## 2025-09-03 DIAGNOSIS — C61 PROSTATE CANCER METASTATIC TO BONE (HCC): ICD-10-CM

## 2025-09-03 DIAGNOSIS — D72.820 LYMPHOCYTOSIS: Primary | ICD-10-CM

## 2025-09-03 DIAGNOSIS — C79.51 PROSTATE CANCER METASTATIC TO BONE (HCC): ICD-10-CM

## 2025-09-03 LAB
ALBUMIN SERPL-MCNC: 3.4 G/DL (ref 3.5–5.2)
ALBUMIN/GLOB SERPL: 1 (ref 1.1–2.2)
ALP SERPL-CCNC: 96 U/L (ref 40–129)
ALT SERPL-CCNC: 21 U/L (ref 10–50)
ANION GAP SERPL CALC-SCNC: 12 MMOL/L (ref 2–14)
AST SERPL-CCNC: 18 U/L (ref 10–50)
BASOPHILS # BLD: 0.07 K/UL (ref 0–0.1)
BASOPHILS NFR BLD: 0.7 % (ref 0–1)
BILIRUB SERPL-MCNC: 0.6 MG/DL (ref 0–1.2)
BUN SERPL-MCNC: 15 MG/DL (ref 8–23)
BUN/CREAT SERPL: 20 (ref 12–20)
CALCIUM SERPL-MCNC: 9.7 MG/DL (ref 8.8–10.2)
CHLORIDE SERPL-SCNC: 101 MMOL/L (ref 98–107)
CO2 SERPL-SCNC: 24 MMOL/L (ref 20–29)
CREAT SERPL-MCNC: 0.76 MG/DL (ref 0.7–1.2)
DIFFERENTIAL METHOD BLD: ABNORMAL
EOSINOPHIL # BLD: 0.15 K/UL (ref 0–0.4)
EOSINOPHIL NFR BLD: 1.6 % (ref 0–7)
ERYTHROCYTE [DISTWIDTH] IN BLOOD BY AUTOMATED COUNT: 12.5 % (ref 11.5–14.5)
GLOBULIN SER CALC-MCNC: 3.4 G/DL (ref 2–4)
GLUCOSE SERPL-MCNC: 127 MG/DL (ref 65–100)
HCT VFR BLD AUTO: 36.5 % (ref 36.6–50.3)
HGB BLD-MCNC: 12.6 G/DL (ref 12.1–17)
IMM GRANULOCYTES # BLD AUTO: 0.09 K/UL (ref 0–0.04)
IMM GRANULOCYTES NFR BLD AUTO: 0.9 % (ref 0–0.5)
LYMPHOCYTES # BLD: 2.85 K/UL (ref 0.8–3.5)
LYMPHOCYTES NFR BLD: 29.7 % (ref 12–49)
MCH RBC QN AUTO: 33 PG (ref 26–34)
MCHC RBC AUTO-ENTMCNC: 34.5 G/DL (ref 30–36.5)
MCV RBC AUTO: 95.5 FL (ref 80–99)
MONOCYTES # BLD: 0.78 K/UL (ref 0–1)
MONOCYTES NFR BLD: 8.1 % (ref 5–13)
NEUTS SEG # BLD: 5.66 K/UL (ref 1.8–8)
NEUTS SEG NFR BLD: 59 % (ref 32–75)
NRBC # BLD: 0 K/UL (ref 0–0.01)
NRBC BLD-RTO: 0 PER 100 WBC
PLATELET # BLD AUTO: 242 K/UL (ref 150–400)
PMV BLD AUTO: 10.2 FL (ref 8.9–12.9)
POTASSIUM SERPL-SCNC: 3.9 MMOL/L (ref 3.5–5.1)
PROT SERPL-MCNC: 6.8 G/DL (ref 6.4–8.3)
PSA SERPL-MCNC: 2.3 NG/ML (ref 0–4.1)
RBC # BLD AUTO: 3.82 M/UL (ref 4.1–5.7)
SODIUM SERPL-SCNC: 137 MMOL/L (ref 136–145)
WBC # BLD AUTO: 9.6 K/UL (ref 4.1–11.1)

## 2025-09-03 PROCEDURE — 6360000002 HC RX W HCPCS

## 2025-09-03 PROCEDURE — 85025 COMPLETE CBC W/AUTO DIFF WBC: CPT

## 2025-09-03 PROCEDURE — 84153 ASSAY OF PSA TOTAL: CPT

## 2025-09-03 PROCEDURE — 84403 ASSAY OF TOTAL TESTOSTERONE: CPT

## 2025-09-03 PROCEDURE — 80053 COMPREHEN METABOLIC PANEL: CPT

## 2025-09-03 PROCEDURE — 96402 CHEMO HORMON ANTINEOPL SQ/IM: CPT

## 2025-09-03 RX ORDER — SODIUM CHLORIDE 9 MG/ML
INJECTION, SOLUTION INTRAVENOUS CONTINUOUS
OUTPATIENT
Start: 2025-09-28

## 2025-09-03 RX ORDER — HYDROCORTISONE SODIUM SUCCINATE 100 MG/2ML
100 INJECTION INTRAMUSCULAR; INTRAVENOUS
OUTPATIENT
Start: 2025-09-28

## 2025-09-03 RX ORDER — DIPHENHYDRAMINE HYDROCHLORIDE 50 MG/ML
50 INJECTION, SOLUTION INTRAMUSCULAR; INTRAVENOUS
OUTPATIENT
Start: 2025-09-28

## 2025-09-03 RX ORDER — ONDANSETRON 2 MG/ML
8 INJECTION INTRAMUSCULAR; INTRAVENOUS
OUTPATIENT
Start: 2025-09-28

## 2025-09-03 RX ORDER — ACETAMINOPHEN 325 MG/1
650 TABLET ORAL
OUTPATIENT
Start: 2025-09-28

## 2025-09-03 RX ORDER — EPINEPHRINE 1 MG/ML
0.3 INJECTION, SOLUTION INTRAMUSCULAR; SUBCUTANEOUS PRN
OUTPATIENT
Start: 2025-09-28

## 2025-09-03 RX ORDER — ALBUTEROL SULFATE 90 UG/1
4 INHALANT RESPIRATORY (INHALATION) PRN
OUTPATIENT
Start: 2025-09-28

## 2025-09-03 RX ADMIN — LEUPROLIDE ACETATE 22.5 MG: KIT at 10:31

## 2025-09-03 ASSESSMENT — PAIN DESCRIPTION - ORIENTATION: ORIENTATION: RIGHT;LEFT

## 2025-09-03 ASSESSMENT — PAIN SCALES - GENERAL: PAINLEVEL_OUTOF10: 6

## 2025-09-03 ASSESSMENT — PAIN DESCRIPTION - DESCRIPTORS: DESCRIPTORS: TINGLING

## 2025-09-03 ASSESSMENT — PAIN DESCRIPTION - LOCATION: LOCATION: FOOT

## 2025-09-04 LAB — TESTOST SERPL-MCNC: <3 NG/DL (ref 264–916)
